# Patient Record
Sex: FEMALE | Race: WHITE | NOT HISPANIC OR LATINO | Employment: PART TIME | URBAN - METROPOLITAN AREA
[De-identification: names, ages, dates, MRNs, and addresses within clinical notes are randomized per-mention and may not be internally consistent; named-entity substitution may affect disease eponyms.]

---

## 2017-07-07 ENCOUNTER — HOSPITAL ENCOUNTER (OUTPATIENT)
Dept: GENERAL RADIOLOGY | Facility: HOSPITAL | Age: 57
Discharge: HOME | End: 2017-07-07
Attending: SPECIALIST | Admitting: SPECIALIST

## 2017-07-07 DIAGNOSIS — M54.2 NECK PAIN: ICD-10-CM

## 2017-07-07 PROCEDURE — 72050 X-RAY EXAM NECK SPINE 4/5VWS: CPT

## 2017-10-16 ENCOUNTER — OFFICE VISIT (OUTPATIENT)
Dept: OBSTETRICS AND GYNECOLOGY | Facility: CLINIC | Age: 57
End: 2017-10-16

## 2017-10-16 VITALS
HEIGHT: 66 IN | DIASTOLIC BLOOD PRESSURE: 84 MMHG | WEIGHT: 162 LBS | BODY MASS INDEX: 26.03 KG/M2 | SYSTOLIC BLOOD PRESSURE: 122 MMHG

## 2017-10-16 DIAGNOSIS — Z78.0 POSTMENOPAUSAL: Primary | ICD-10-CM

## 2017-10-16 DIAGNOSIS — IMO0002 HORMONE DEFICIENCY: ICD-10-CM

## 2017-10-16 DIAGNOSIS — N95.2 VAGINAL ATROPHY: ICD-10-CM

## 2017-10-16 DIAGNOSIS — R68.82 DECREASED LIBIDO: ICD-10-CM

## 2017-10-16 PROCEDURE — 99396 PREV VISIT EST AGE 40-64: CPT | Performed by: OBSTETRICS & GYNECOLOGY

## 2017-10-16 RX ORDER — SERTRALINE HYDROCHLORIDE 100 MG/1
100 TABLET, FILM COATED ORAL
COMMUNITY
End: 2020-08-27

## 2017-10-16 NOTE — PROGRESS NOTES
Subjective:    Patient Tracee Knutson is a 57 y.o. female.   Chief Complaint   Patient presents with   • Gynecologic Exam     Patient is here for annual.   CC's patient is menopausal now she had a very difficult time going through the perimenopause with the severe PMS symptoms and significant  hormone  issues      HPI  this patient is postmenopausal has had chronic depressive episodes since the the past 5-10 years Zoloft Which Does Very Well  she feels very good now being postmenopausal she was on hormone pellets for 2 years but has discontinued them      The following portions of the patient's history were reviewed and updated as appropriate: allergies, current medications, past family history, past medical history, past social history, past surgical history and problem list.      Review of Systems   Constitutional: Negative.    HENT: Negative.    Eyes: Negative.    Respiratory: Negative.    Cardiovascular: Negative.    Gastrointestinal: Negative for abdominal distention, abdominal pain, anal bleeding, blood in stool, constipation, diarrhea, nausea, rectal pain and vomiting.   Endocrine: Negative for cold intolerance, heat intolerance, polydipsia, polyphagia and polyuria.   Genitourinary: Negative.  Negative for decreased urine volume, dyspareunia, dysuria, enuresis, flank pain, frequency, genital sores, hematuria, menstrual problem, pelvic pain, urgency, vaginal bleeding, vaginal discharge and vaginal pain.   Musculoskeletal: Negative.    Skin: Negative.    Allergic/Immunologic: Negative.    Neurological: Negative.    Hematological: Negative for adenopathy. Does not bruise/bleed easily.   Psychiatric/Behavioral: Negative for agitation, confusion and sleep disturbance. The patient is not nervous/anxious.          Objective:      Physical Exam   Constitutional: She appears well-developed and well-nourished. She is not intubated.   HENT:   Head: Hair is normal.   Nose: Nose normal.   Mouth/Throat: Oropharynx is clear  and moist.   Eyes: Conjunctivae are normal.   Neck: Normal carotid pulses and no JVD present. No tracheal tenderness, no spinous process tenderness and no muscular tenderness present. Carotid bruit is not present. No rigidity. No edema, no erythema and normal range of motion present. No thyroid mass and no thyromegaly present.   Cardiovascular: Normal rate, regular rhythm, S1 normal and normal heart sounds.  Exam reveals no gallop.    No murmur heard.  Pulmonary/Chest: Effort normal. No accessory muscle usage or stridor. No apnea, no tachypnea and no bradypnea. She is not intubated. No respiratory distress. She has no wheezes. She has no rales. She exhibits no tenderness. Right breast exhibits no inverted nipple, no mass, no nipple discharge, no skin change and no tenderness. Left breast exhibits no inverted nipple, no mass, no nipple discharge, no skin change and no tenderness.   Abdominal: Soft. Bowel sounds are normal. She exhibits no distension and no mass. There is no tenderness. There is no rebound and no guarding. No hernia.   Genitourinary: Vagina normal and uterus normal. Rectal exam shows no external hemorrhoid, no internal hemorrhoid, no fissure, no mass, no tenderness and anal tone normal. There is no rash, tenderness, lesion or injury on the right labia. There is no rash, tenderness, lesion or injury on the left labia. Uterus is not deviated, not enlarged, not fixed and not tender. Cervix exhibits no motion tenderness, no discharge and no friability. Right adnexum displays no mass and no tenderness. Left adnexum displays no mass and no tenderness. No erythema, tenderness or bleeding in the vagina. No foreign body in the vagina. No signs of injury around the vagina. No vaginal discharge found.   Genitourinary Comments: Vaginal atrophy patient's had no bleeding no other issues   Musculoskeletal: She exhibits no edema or tenderness.        Right shoulder: She exhibits no tenderness, no swelling, no pain  and no spasm.   Lymphadenopathy:        Head (right side): No submental, no submandibular, no tonsillar, no preauricular, no posterior auricular and no occipital adenopathy present.        Head (left side): No submental, no submandibular, no tonsillar, no preauricular, no posterior auricular and no occipital adenopathy present.     She has no cervical adenopathy.        Right cervical: No superficial cervical, no deep cervical and no posterior cervical adenopathy present.       Left cervical: No superficial cervical, no deep cervical and no posterior cervical adenopathy present.        Right axillary: No pectoral and no lateral adenopathy present.        Left axillary: No pectoral and no lateral adenopathy present.       Right: No inguinal, no supraclavicular and no epitrochlear adenopathy present.        Left: No inguinal, no supraclavicular and no epitrochlear adenopathy present.   Neurological: No cranial nerve deficit. Coordination normal.   Skin: Skin is warm and dry. No abrasion, no bruising, no burn, no lesion, no petechiae, no purpura and no rash noted. Rash is not macular, not maculopapular, not nodular and not urticarial. No cyanosis or erythema. No pallor. Nails show no clubbing.   Psychiatric: She has a normal mood and affect. Her behavior is normal.         Assessment and Plan:  Patient was on hormones but did not like them and is now off of everything she does have symptoms of vaginal atrophy and exam does show vaginal atrophy so the patient should use the estrogen cream for the vagina and a and D ointment    Patient has been instructed to perform a self breast exam on a weekly basis, a yearly mammogram, pap smear yearly unless instructed otherwise and bone density every 2 years.  I recommended that the patient not smoke, and discussed smoking cessation when appropriate.     Tracee was seen today for gynecologic exam.    Diagnoses and all orders for this visit:    Postmenopausal    Hormone  deficiency    Vaginal atrophy    Decreased libido

## 2017-10-19 LAB
C TRACH RRNA CVX QL NAA+PROBE: NEGATIVE
CONV .: NORMAL
CYTOLOGIST CVX/VAG CYTO: NORMAL
CYTOLOGY CVX/VAG DOC THIN PREP: NORMAL
DX ICD CODE: NORMAL
HIV 1 & 2 AB SER-IMP: NORMAL
N GONORRHOEA RRNA CVX QL NAA+PROBE: NEGATIVE
OTHER STN SPEC: NORMAL
PATH REPORT.FINAL DX SPEC: NORMAL
STAT OF ADQ CVX/VAG CYTO-IMP: NORMAL
T VAGINALIS RRNA SPEC QL NAA+PROBE: NEGATIVE

## 2017-11-09 ENCOUNTER — OFFICE VISIT (OUTPATIENT)
Dept: NEUROSURGERY | Facility: CLINIC | Age: 57
End: 2017-11-09

## 2017-11-09 VITALS
DIASTOLIC BLOOD PRESSURE: 76 MMHG | WEIGHT: 161 LBS | BODY MASS INDEX: 25.88 KG/M2 | HEIGHT: 66 IN | SYSTOLIC BLOOD PRESSURE: 128 MMHG

## 2017-11-09 DIAGNOSIS — M54.2 CERVICALGIA: Primary | ICD-10-CM

## 2017-11-09 PROCEDURE — 99243 OFF/OP CNSLTJ NEW/EST LOW 30: CPT | Performed by: NEUROLOGICAL SURGERY

## 2017-11-09 NOTE — PROGRESS NOTES
Subjective   Patient ID: Tracee Knutson is a 57 y.o. female who is being seen for consultation today at the request of Jesus Roberts MD for neck pain. She had an MRI of the cervical spine at [facility] on 11/1/17. She presents unaccompanied.     History of Present Illness 56 yo female with a history of neck pain into the paraspinal muscles on the right which began sponataneously.  She reports daily pain.  Lifting aggravates.  Sleep is interrupted.  SHe reports some urinary retention type of complaints.  No genital numbness.  Strength is normal.  She reports her pain is rated 2-3 currently.  There is positional exacerbation to a 9/10.  She is a nonsmoker.  Patient has had traction and accupuncture without help.  She had an IM injection of steroids which helped.  SHe take sibuprfen 200mg qod.      The following portions of the patient's history were reviewed and updated as appropriate: allergies, current medications, past family history, past medical history, past social history, past surgical history and problem list.    Review of Systems   Constitutional: Negative for chills and fever.   Respiratory: Negative for cough and shortness of breath.    Cardiovascular: Negative for chest pain, palpitations and leg swelling.   Gastrointestinal: Negative for abdominal pain and constipation.   Genitourinary: Negative for difficulty urinating and enuresis.   Musculoskeletal: Positive for neck pain. Negative for back pain and gait problem.   Skin: Negative for rash.   Neurological: Negative for weakness and numbness (or tingling).   Hematological: Does not bruise/bleed easily.   Psychiatric/Behavioral: Negative for sleep disturbance.       Objective   Physical Exam   Constitutional: She is oriented to person, place, and time.   Neurological: She is oriented to person, place, and time. Gait normal.   Reflex Scores:       Tricep reflexes are 1+ on the right side and 1+ on the left side.       Bicep reflexes are 1+ on the right side  and 1+ on the left side.       Brachioradialis reflexes are 1+ on the right side and 1+ on the left side.       Patellar reflexes are 1+ on the right side and 1+ on the left side.       Achilles reflexes are 1+ on the right side and 1+ on the left side.    Neurologic Exam     Mental Status   Oriented to person, place, and time.     Motor Exam   Muscle bulk: normal  Overall muscle tone: normal    Strength   Right deltoid: 5/5  Left deltoid: 5/5  Right biceps: 5/5  Left biceps: 5/5  Right triceps: 5/5  Left triceps: 5/5  Right wrist flexion: 5/5  Left wrist flexion: 5/5  Right wrist extension: 5/5  Left wrist extension: 5/5  Right interossei: 5/5  Left interossei: 5/5  Right iliopsoas: 5/5  Left iliopsoas: 5/5  Right quadriceps: 5/5  Left quadriceps: 5/5  Right hamstrin/5  Left hamstrin/5  Right anterior tibial: 5/5  Left anterior tibial: 5/5  Right gastroc: 5/5  Left gastroc: 5/5    Sensory Exam   Right arm light touch: diminished around right deltoid.  Left arm light touch: normal  Right leg light touch: normal  Left leg light touch: normal  Right arm pinprick: diminished around right deltoid.  Left arm pinprick: normal  Right leg pinprick: normal  Left leg pinprick: normal    Gait, Coordination, and Reflexes     Gait  Gait: normal    Reflexes   Right brachioradialis: 1+  Left brachioradialis: 1+  Right biceps: 1+  Left biceps: 1+  Right triceps: 1+  Left triceps: 1+  Right patellar: 1+  Left patellar: 1+  Right achilles: 1+  Left achilles: 1+      Assessment/Plan   Independent Review of Radiographic Studies:    Mild reversal of the normal lordosis of the c spine secondary to diffuse ddd.  RIght sided c34 or c45 foraminal stenosis  Medical Decision Making:  Patient disease is relatively mild but there are a few focal spots which could result in her discomfort.  I suggested diclofenac.  She has a sensitive stomach and I implored her to take nsaids with food (may need to switch to mobic if intolerant).  I  ordered diclofenac and will see her back.  JU[facility] CARLOS's PT , home traction or gabapentin all may play a role.  Surgery would be a last resort unless she developed refractory pain or other red flags per her desires.      Tracee was seen today for neck pain.    Diagnoses and all orders for this visit:    Cervicalgia    Other orders  -     diclofenac (VOLTAREN) 50 MG EC tablet; Take 1 tablet by mouth 2 (Two) Times a Day.      No Follow-up on file.

## 2017-12-18 ENCOUNTER — OFFICE VISIT (OUTPATIENT)
Dept: NEUROSURGERY | Facility: CLINIC | Age: 57
End: 2017-12-18

## 2017-12-18 VITALS
SYSTOLIC BLOOD PRESSURE: 90 MMHG | BODY MASS INDEX: 26.2 KG/M2 | DIASTOLIC BLOOD PRESSURE: 60 MMHG | WEIGHT: 163 LBS | HEIGHT: 66 IN

## 2017-12-18 DIAGNOSIS — M54.2 CERVICALGIA: Primary | ICD-10-CM

## 2017-12-18 PROCEDURE — 99213 OFFICE O/P EST LOW 20 MIN: CPT | Performed by: NEUROLOGICAL SURGERY

## 2017-12-18 RX ORDER — PREGABALIN 50 MG/1
50 CAPSULE ORAL
COMMUNITY
End: 2020-08-27

## 2017-12-18 NOTE — PROGRESS NOTES
Subjective   Patient ID: Tracee Knutson is a 57 y.o. female who is here today for follow-up for neck pain. She presents unaccompanied.     History of Present Illness  She tried diclofenac for 3 weeks with nominal results.  SHe has most trouble at night.  Lifting also exacerbates.  Most of her pain is in the paraspinal region and right shoulder.      The following portions of the patient's history were reviewed and updated as appropriate: allergies, current medications, past family history, past medical history, past social history, past surgical history and problem list.    Review of Systems   Musculoskeletal: Positive for arthralgias (Right Shoulder ) and neck pain.   Neurological: Negative for numbness.       Objective   Physical Exam  Neurologic Exam      Strength   Right deltoid: 5/5  Left deltoid: 5/5  Right biceps: 5/5  Left biceps: 5/5  Right triceps: 5/5  Left triceps: 5/5  Right wrist flexion: 5/5  Left wrist flexion: 5/5  Right wrist extension: 5/5  Left wrist extension: 5/5  Right interossei: 5/5  Left interossei: 5/5    Reflexes   Right brachioradialis: 1+  Left brachioradialis: 1+  Right biceps: 1+  Left biceps: 1+  Right triceps: 1+  Left triceps: 1+  Right patellar: 1+  Left patellar: 1+  Right achilles: 1+  Left achilles: 1+    Sensory Exam   Right arm light touch: diminished around right deltoid.  Left arm light touch: normal  Right leg light touch: normal  Left leg light touch: normal  Right arm pinprick: diminished around right deltoid.  Left arm pinprick: normal  Right leg pinprick: normal  Left leg pinprick: normal      Some pain at AC joint on the right  Painless ROM of bilateral shoulders.       Assessment/Plan   Independent Review of Radiographic Studies:      Medical Decision Making:  SHe failed diclofenac and was started qhs lyrica with limited results.  We will refer her to PT and see her in 1 months time.  If she cannot tolerate PT she will call and I will refer for [facility] CARL[facility]'s.     Tracee was  seen today for neck pain.    Diagnoses and all orders for this visit:    Cervicalgia  -     Ambulatory Referral to Physical Therapy Evaluate and treat    No Follow-up on file.

## 2017-12-27 ENCOUNTER — APPOINTMENT (OUTPATIENT)
Dept: PHYSICAL THERAPY | Facility: HOSPITAL | Age: 57
End: 2017-12-27
Attending: NEUROLOGICAL SURGERY

## 2018-01-05 ENCOUNTER — HOSPITAL ENCOUNTER (OUTPATIENT)
Dept: PHYSICAL THERAPY | Facility: HOSPITAL | Age: 58
Setting detail: THERAPIES SERIES
Discharge: HOME | End: 2018-01-05
Attending: NEUROLOGICAL SURGERY

## 2018-01-05 DIAGNOSIS — M54.2 CERVICALGIA: Primary | ICD-10-CM

## 2018-01-05 PROCEDURE — 97162 PT EVAL MOD COMPLEX 30 MIN: CPT | Performed by: PHYSICAL THERAPIST

## 2018-01-05 PROCEDURE — G8982 BODY POS GOAL STATUS: HCPCS | Performed by: PHYSICAL THERAPIST

## 2018-01-05 PROCEDURE — 97110 THERAPEUTIC EXERCISES: CPT | Performed by: PHYSICAL THERAPIST

## 2018-01-05 PROCEDURE — 97140 MANUAL THERAPY 1/> REGIONS: CPT | Performed by: PHYSICAL THERAPIST

## 2018-01-05 PROCEDURE — G8981 BODY POS CURRENT STATUS: HCPCS | Performed by: PHYSICAL THERAPIST

## 2018-01-05 NOTE — THERAPY EVALUATION
Outpatient Physical Therapy Ortho Initial Evaluation  [facility]     Patient Name: Tracee Knutson  : 1960  MRN: [id number]  Today's Date: 2018      Visit Date: 2018    Patient Active Problem List   Diagnosis   • Cervicalgia        Past Medical History:   Diagnosis Date   • Depression    • Depression    • TIA (transient ischemic attack)         Past Surgical History:   Procedure Laterality Date   • MIDDLE EAR SURGERY         Visit Dx:     ICD-10-CM ICD-9-CM   1. Cervicalgia M54.2 723.1             Patient History       18 1500          History    Chief Complaint Pain  -KJ      Type of Pain Neck pain  -KJ      Date Current Problem(s) Began 17  -KJ      Brief Description of Current Complaint R sided neck and shoulder pain started insidiously 8 months ago. Trying not to be on the phone, tried not watching TV. Kept thinking it would go away. PCP gave shot, that helped a bit when she had to go out of town for a month. Tried medication but no change. Stands for work, walking and standing is OK. Sitting and carrying is bad, tingling and numbness into shoulder. Doesn't exercise but not sure what to do. Ache, stiffness inn base of neck, tingling in neck and to R shoulder, burning, numb. Nothing past shoulder. No weakness. No change with valsalva. Not sure what to do to manage. Tried some creams. Tries Aleve but still nothing helps sleep. Tried a bunch of different pillows. Hard to fall asleep, doesn't wake up that she knows. Has done PT for lower back. No falls.   -KJ      Fall Risk Assessment    Any falls in the past year: No  -KJ        User Key  (r) = Recorded By, (t) = Taken By, (c) = Cosigned By    Initials Name Provider Type    FLOYD Samaniego, PT Physical Therapist                PT Ortho       18 1100    Posture/Observations    Posture/Observations Comments In sitting in lobby, pt. slouced and reclined. Excellen posture in standing and sitting on edge of chair and mat.    -KJ    Sensory Screen for Light Touch- Upper Quarter Clearing    C4 (posterior shoulder) Right:;Diminished;Left:;Intact  -KJ    C5 (lateral upper arm) Bilateral:;Intact  -KJ    C6 (tip of thumb) Bilateral:;Intact  -KJ    C7 (tip of 3rd finger) Bilateral:;Intact  -KJ    C8 (tip of 5th finger) Bilateral:;Intact  -KJ    Myotomal Screen- Upper Quarter Clearing    Shoulder flexion (C5) WNL;4+ (Good +)  -KJ    Elbow flexion/wrist extension (C6) Bilateral:;4+ (Good +)  -KJ    Elbow extension/wrist flexion (C7) Bilateral:;4+ (Good +)  -KJ    Finger flexion/ (C8) Bilateral:;4 (Good)  -KJ    Finger abduction (T1) Bilateral:;4- (Good -)  -KJ    Cervical/Shoulder ROM Screen    Cervical flexion Impaired   75% of WNL no pain  -KJ    Cervical extension Normal   no pain  -KJ    Cervical lateral flexion Impaired   40% to R, 50% to left with tightness on R  -KJ    Cervical rotation Impaired   75% B without pain  -KJ    Cervical Palpation    Upper Traps Right:;Tender;Guarded/taut;Trigger point  -KJ    Cervical/Thoracic Special Tests    Spurlings (Foraminal Compression) Bilateral:;Negative  -KJ    Cervical Compression (Forarminal Compression vs. Facet Pain) Positive   mild discomfort on R  -KJ    Cervical Distraction (Foraminal Compression vs. Facet Pain) Positive  -KJ    MMT (Manual Muscle Testing)    General MMT Assessment Detail Cervical isometrics grossly 4/5 except extension 4+/5  -KJ      User Key  (r) = Recorded By, (t) = Taken By, (c) = Cosigned By    Initials Name Provider Type    FLOYD Samaniego, PT Physical Therapist                      Therapy Education  Education Details: At length disucssion regarding evaluation findings, imaging, prognosis, importance of posture, cervical and lumbar pillow positioning, PT treatment options, plan of care. Use of ice or heat is fine.   Given: HEP, Symptoms/condition management, Pain management, Posture/body mechanics  Program: New  How Provided: Verbal, Demonstration,  "Written  Provided to: Patient  Level of Understanding: Teach back education performed, Verbalized, Demonstrated           PT OP Goals       01/05/18 1500       PT Short Term Goals    STG Date to Achieve 01/19/18  -KJ     STG 1 Pt. will be independent and compliant with initial home exercise program.  -KJ     STG 1 Progress New  -KJ     STG 2 Pt. will report R neck pain </=4-5/10 to increase ease of sitting to watch TV.   -KJ     STG 2 Progress New  -KJ     STG 3 Pt. will report 20% faster falling asleep time due to neck pain.  -KJ     STG 3 Progress New  -KJ     Long Term Goals    LTG Date to Achieve 02/04/18  -KJ     LTG 1 Pt. will be independent and compliant with advanced home exercise program.   -KJ     LTG 1 Progress New  -KJ     LTG 2 Pt. will report R neck pain </= 2-3/10 to increase ease of sitting through meal.   -KJ     LTG 2 Progress New  -KJ     LTG 3 Pt. will score </=22% on NDI, indicating decreased perceived functinoal disability.  -KJ     LTG 3 Progress New  -KJ     Time Calculation    PT Goal Re-Cert Due Date 02/04/18  -KJ       User Key  (r) = Recorded By, (t) = Taken By, (c) = Cosigned By    Initials Name Provider Type    FLOYD Samaniego, PT Physical Therapist                PT Assessment/Plan       01/05/18 1517       PT Assessment    Functional Limitations Performance in self-care ADL;Limitation in home management;Limitations in functional capacity and performance  -KJ     Impairments Pain;Muscle strength;Joint mobility;Joint integrity;Posture;Range of motion  -KJ     Assessment Comments Pt. is a 57 year old female referred to outpatient therapy for 8 month history of evolving R cervical pain with radicular symptoms to R shoulder. Imaging (+) for moderate to severe foraminal narrowing with nerve involvement. Pt. presents with slightly decreased cervical ROM, excellent posture with focus, poor when \"relaxed,\" (+) Compression test for mild symptoms, trigger points R UT, (+) Distraction test, " and Neck Disability score of 32% where 0% represents no perceived functional disbility. Pt.'s signs and symptoms are consistent with cervical DJD. Pt. will benefit from skilled physical therapy to address these issues that affect her participation in sleeping, sitting, and lifting.   -KJ     Please refer to paper survey for additional self-reported information Yes  -KJ     Rehab Potential Good  -KJ     Patient/caregiver participated in establishment of treatment plan and goals Yes  -KJ     Patient would benefit from skilled therapy intervention Yes  -KJ     PT Plan    PT Frequency 2x/week;1x/week  -KJ     Predicted Duration of Therapy Intervention (days/wks) 4 weeks  -KJ     Planned CPT's? PT EVAL MOD COMPLELITY: 87451;PT MANUAL THERAPY EA 15 MIN: 17524;PT ULTRASOUND EA 15 MIN: 67963;PT TRACTION CERVICAL: 90223;PT HOT OR COLD PACK TREAT MCARE;PT ELECTRICAL STIM UNATTEND: ;PT THER PROC EA 15 MIN: 02133  -KJ     PT Plan Comments Trial of cervical traction, progress postural stab strengthening program (chin tucks, supine over towel, doorway stretch, theraband ex's, etc). Discuss DDN and trial to R UT.   -KJ       User Key  (r) = Recorded By, (t) = Taken By, (c) = Cosigned By    Initials Name Provider Type    FLOYD Samaniego, PT Physical Therapist                  Exercises       01/05/18 1100          Exercise 1    Exercise Name 1 Reverse shoulder rolls  -KJ      Cueing 1 Verbal  -KJ      Reps 1 5  -KJ      Exercise 2    Exercise Name 2 Scapular retraction  -KJ      Cueing 2 Verbal  -KJ      Reps 2 5  -KJ        User Key  (r) = Recorded By, (t) = Taken By, (c) = Cosigned By    Initials Name Provider Type    FLOYD Samaniego, PT Physical Therapist           Manual Rx (last 36 hours)      Manual Treatments       01/05/18 1500          Manual Rx 1    Manual Rx 1 Type Gentle manual cervical distraction   -KJ        User Key  (r) = Recorded By, (t) = Taken By, (c) = Cosigned By    Initials Name Provider Type     KJ Kena Samaniego, PT Physical Therapist                      Outcome Measure Options: Neck Disability Index (NDI)  Neck Disability Index  Neck Disability Index Comments: 32%      Time Calculation:   Start Time: 1115  Stop Time: 1200  Time Calculation (min): 45 min     Therapy Charges for Today     Code Description Service Date Service Provider Modifiers Qty    68410358733 HC PT CHNG MAIN POS CURRENT 1/5/2018 Kena Samaniego, PT GP, CJ 1    72646651681 HC PT CHNG MAIN POS PROJECTED 1/5/2018 Kena Samaniego, PT GP, CI 1    19723676750 HC PT THER PROC EA 15 MIN 1/5/2018 Kena Samaniego, PT GP 1    09783318034 HC PT EVAL MOD COMPLEXITY 1 1/5/2018 Kena Samaniego, PT GP 1    76925561415 HC PT MANUAL THERAPY EA 15 MIN 1/5/2018 Kena Samaniego, PT GP 1          PT G-Codes  Outcome Measure Options: Neck Disability Index (NDI)  Score: 32%  Functional Limitation: Changing and maintaining body position  Changing and Maintaining Body Position Current Status (): At least 20 percent but less than 40 percent impaired, limited or restricted  Changing and Maintaining Body Position Goal Status (): At least 1 percent but less than 20 percent impaired, limited or restricted         Kena Samaniego, PT  1/5/2018

## 2018-01-11 ENCOUNTER — HOSPITAL ENCOUNTER (OUTPATIENT)
Dept: PHYSICAL THERAPY | Facility: HOSPITAL | Age: 58
Setting detail: THERAPIES SERIES
Discharge: HOME | End: 2018-01-11

## 2018-01-11 DIAGNOSIS — M54.2 CERVICALGIA: Primary | ICD-10-CM

## 2018-01-11 PROCEDURE — 97110 THERAPEUTIC EXERCISES: CPT | Performed by: PHYSICAL THERAPIST

## 2018-01-11 PROCEDURE — 97140 MANUAL THERAPY 1/> REGIONS: CPT | Performed by: PHYSICAL THERAPIST

## 2018-01-11 NOTE — THERAPY TREATMENT NOTE
Outpatient Physical Therapy Ortho Treatment Note  [facility]     Patient Name: Tracee Knutson  : 1960  MRN: [id number]  Today's Date: 2018      Visit Date: 2018    Visit Dx:    ICD-10-CM ICD-9-CM   1. Cervicalgia M54.2 723.1       Patient Active Problem List   Diagnosis   • Cervicalgia        Past Medical History:   Diagnosis Date   • Depression    • Depression    • TIA (transient ischemic attack)         Past Surgical History:   Procedure Laterality Date   • MIDDLE EAR SURGERY                               PT Assessment/Plan       18 1732       PT Assessment    Assessment Comments First session since initial evalution. Reviewed initial HEP and added new postural re-education exercises. Neck book for home study/education given on posture and exacerbating activities. Manual therapy techniques revealed increased tension/trigger point in R upper trap and R paraspinal tissue (c2-3, c3-4).   -CK     PT Plan    PT Plan Comments UBE, exercises as indicated in flowsheet, consider addition of upper trap stretch, chin tucks. Traction if indicated.  -CK       User Key  (r) = Recorded By, (t) = Taken By, (c) = Cosigned By    Initials Name Provider Type    CK Cruz Wayne, PT Physical Therapist                    Exercises       18 1600          Subjective Comments    Subjective Comments Pain is worse at night watching tv or trying to sleeping. Pain/tingling goes down to tip of shoulder.   -CK      Subjective Pain    Able to rate subjective pain? yes  -CK      Pre-Treatment Pain Level 4  -CK      Exercise 1    Exercise Name 1 Reverse shoulder rolls  -CK      Cueing 1 Verbal  -CK      Reps 1 15  -CK      Exercise 2    Exercise Name 2 Scapular retraction  -CK      Cueing 2 Verbal  -CK      Reps 2 10  -CK      Time (Seconds) 2 5  -CK      Exercise 3    Exercise Name 3 thoracic towel roll stretch  -CK      Time (Minutes) 3 2  -CK      Additional Comments alternating arms overhead x 15  -CK       Exercise 4    Exercise Name 4 shoulder ext  -CK      Cueing 4 Tactile;Verbal  -CK      Reps 4 15  -CK      Additional Comments RTB  -CK      Exercise 5    Exercise Name 5 Doorway stretch  -CK      Reps 5 3  -CK      Time (Seconds) 5 30  -CK      Exercise 6    Exercise Name 6 UBE  -CK      Time (Minutes) 6 4  -CK      Additional Comments watch posture  -CK        User Key  (r) = Recorded By, (t) = Taken By, (c) = Cosigned By    Initials Name Provider Type    CK Cruz S Tone, PT Physical Therapist                        Manual Rx (last 36 hours)      Manual Treatments       01/11/18 1700          Manual Rx 1    Manual Rx 1 Location STM/MFR to B upper traps, levator and cervical paraspinals  -CK      Manual Rx 1 Type pt prone and supine  -CK      Manual Rx 1 Grade gentle manual cervical distraction as well as PA and lateral glides (multiple ranges of rotation)  -CK      Manual Rx 1 Duration Passive stretching to B upper traps  -CK      Manual Rx 2    Manual Rx 2 Location Total time- 25 min  -CK        User Key  (r) = Recorded By, (t) = Taken By, (c) = Cosigned By    Initials Name Provider Type    CK Cruz PEREZ Tone, PT Physical Therapist                PT OP Goals       01/11/18 1700       PT Short Term Goals    STG Date to Achieve 01/19/18  -CK     STG 1 Pt. will be independent and compliant with initial home exercise program.  -CK     STG 1 Progress Ongoing  -CK     STG 1 Progress Comments reports compliance  -CK     STG 2 Pt. will report R neck pain </=4-5/10 to increase ease of sitting to watch TV.   -CK     STG 2 Progress Ongoing  -CK     STG 2 Progress Comments 4/5  -CK     STG 3 Pt. will report 20% faster falling asleep time due to neck pain.  -CK     STG 3 Progress Ongoing  -CK     Long Term Goals    LTG Date to Achieve 02/04/18  -CK     LTG 1 Pt. will be independent and compliant with advanced home exercise program.   -CK     LTG 1 Progress Ongoing  -CK     LTG 1 Progress Comments progressed today  -CK     LTG  2 Pt. will report R neck pain </= 2-3/10 to increase ease of sitting through meal.   -CK     LTG 2 Progress Ongoing  -CK     LTG 3 Pt. will score </=22% on NDI, indicating decreased perceived functinoal disability.  -CK     LTG 3 Progress Ongoing  -CK       User Key  (r) = Recorded By, (t) = Taken By, (c) = Cosigned By    Initials Name Provider Type    CK Cruz Wayne, PT Physical Therapist          Therapy Education  Education Details: Cervical book given for home study on posture and body mechanics  Given: Posture/body mechanics, Symptoms/condition management  Program: New  How Provided: Verbal, Demonstration, Written  Provided to: Patient  Level of Understanding: Teach back education performed, Verbalized              Time Calculation:   Start Time: 1630  Stop Time: 1715  Time Calculation (min): 45 min    Therapy Charges for Today     Code Description Service Date Service Provider Modifiers Qty    33733787443  PT MANUAL THERAPY EA 15 MIN 1/11/2018 Cruz Wayne, PT GP 2    74423508517 HC PT THER PROC EA 15 MIN 1/11/2018 Cruz Wayne PT GP 1                    Cruz Wayne, PT  1/11/2018

## 2018-01-19 ENCOUNTER — HOSPITAL ENCOUNTER (OUTPATIENT)
Dept: PHYSICAL THERAPY | Facility: HOSPITAL | Age: 58
Setting detail: THERAPIES SERIES
Discharge: HOME | End: 2018-01-19

## 2018-01-19 DIAGNOSIS — M54.2 CERVICALGIA: Primary | ICD-10-CM

## 2018-01-19 PROCEDURE — 97110 THERAPEUTIC EXERCISES: CPT | Performed by: PHYSICAL THERAPIST

## 2018-01-19 PROCEDURE — 97140 MANUAL THERAPY 1/> REGIONS: CPT | Performed by: PHYSICAL THERAPIST

## 2018-01-19 NOTE — THERAPY TREATMENT NOTE
Outpatient Physical Therapy Ortho Treatment Note  [facility]     Patient Name: Tracee Knutson  : 1960  MRN: [id number]  Today's Date: 2018      Visit Date: 2018    Visit Dx:    ICD-10-CM ICD-9-CM   1. Cervicalgia M54.2 723.1       Patient Active Problem List   Diagnosis   • Cervicalgia        Past Medical History:   Diagnosis Date   • Depression    • Depression    • TIA (transient ischemic attack)         Past Surgical History:   Procedure Laterality Date   • MIDDLE EAR SURGERY                               PT Assessment/Plan       18 1044       PT Assessment    Assessment Comments Continued with low level postural re-education/strengthening exercises prior to manual therapy techniques. Continued tension noted in R cervical/scapular complex. Trigger point noted in R rhomboid tissue, levator tissue, and posterior fibers of upper trap. Education on self release techniques to manage soft tissue tension between sessions.   -CK     PT Plan    PT Plan Comments UBE, exercises in flowsheet, consider addition of upper trap stretch. Traction if indicated.  -CK       User Key  (r) = Recorded By, (t) = Taken By, (c) = Cosigned By    Initials Name Provider Type    JONATHON Wayne, PT Physical Therapist                    Exercises       18 1000          Subjective Comments    Subjective Comments I felt really good for about 24-36 hours after that first session. I have had to carry some heavy stuff the last few days and it seems to have flared in back up. No symptoms down into my arm today.  -CK      Subjective Pain    Able to rate subjective pain? yes  -CK      Pre-Treatment Pain Level 6  -CK      Post-Treatment Pain Level 4  -CK      Exercise 1    Exercise Name 1 Reverse shoulder rolls  -CK      Cueing 1 Verbal  -CK      Sets 1 3  -CK      Reps 1 10  -CK      Additional Comments in between doorway stretches  -CK      Exercise 2    Exercise Name 2 Scapular retraction  -CK      Cueing 2 Verbal   -CK      Reps 2 15  -CK      Time (Seconds) 2 3  -CK      Additional Comments RTB  -CK      Exercise 3    Exercise Name 3 blue noodle thoracic stretch  -CK      Time (Minutes) 3 2  -CK      Additional Comments alternating arms overhead x 15  -CK      Exercise 4    Exercise Name 4 shoulder ext  -CK      Cueing 4 Tactile;Verbal  -CK      Sets 4 2  -CK      Reps 4 10  -CK      Additional Comments RTB  -CK      Exercise 5    Exercise Name 5 Doorway stretch  -CK      Reps 5 3  -CK      Time (Seconds) 5 30  -CK      Exercise 6    Exercise Name 6 UBE  -CK      Time (Minutes) 6 4  -CK      Additional Comments dont lean against chair.   -CK      Exercise 7    Exercise Name 7 supine serratus punches  -CK      Reps 7 15  -CK      Additional Comments #2, on blue noodle  -CK        User Key  (r) = Recorded By, (t) = Taken By, (c) = Cosigned By    Initials Name Provider Type    CK Cruz Wayne PT Physical Therapist                        Manual Rx (last 36 hours)      Manual Treatments       01/19/18 0900          Manual Rx 1    Manual Rx 1 Location STM/MFR to B upper traps, levator, cervical paraspinals, rhomboids, and scapular tissue  -CK      Manual Rx 1 Type pt prone and supine  -CK      Manual Rx 1 Grade gentle manual cervical distraction as well as PA and lateral glides (multiple ranges of rotation)  -CK      Manual Rx 1 Duration Passive stretching to B upper traps and R levator  -CK      Manual Rx 2    Manual Rx 2 Location Total time- 25 min  -CK        User Key  (r) = Recorded By, (t) = Taken By, (c) = Cosigned By    Initials Name Provider Type    CK Cruz Wayne PT Physical Therapist                PT OP Goals       01/19/18 1000       PT Short Term Goals    STG Date to Achieve 01/19/18  -CK     STG 1 Pt. will be independent and compliant with initial home exercise program.  -CK     STG 1 Progress Met  -CK     STG 2 Pt. will report R neck pain </=4-5/10 to increase ease of sitting to watch TV.   -CK     STG 2  Progress Ongoing  -CK     STG 2 Progress Comments 5-6/10 today. flare up from carrying heavy items at work  -CK     STG 3 Pt. will report 20% faster falling asleep time due to neck pain.  -CK     STG 3 Progress Ongoing  -CK     Long Term Goals    LTG Date to Achieve 02/04/18  -CK     LTG 1 Pt. will be independent and compliant with advanced home exercise program.   -CK     LTG 1 Progress Ongoing  -CK     LTG 1 Progress Comments self massage with tennis ball given today  -CK     LTG 2 Pt. will report R neck pain </= 2-3/10 to increase ease of sitting through meal.   -CK     LTG 2 Progress Ongoing  -CK     LTG 3 Pt. will score </=22% on NDI, indicating decreased perceived functinoal disability.  -CK     LTG 3 Progress Ongoing  -CK       User Key  (r) = Recorded By, (t) = Taken By, (c) = Cosigned By    Initials Name Provider Type    CK Cruz Wayne, PT Physical Therapist          Therapy Education  Education Details: Use of tennis ball for self massage/release at home against wall  Given: Symptoms/condition management, Pain management  Program: New  How Provided: Verbal, Demonstration  Provided to: Patient  Level of Understanding: Teach back education performed, Verbalized              Time Calculation:   Start Time: 1000  Stop Time: 1045  Time Calculation (min): 45 min    Therapy Charges for Today     Code Description Service Date Service Provider Modifiers Qty    52761044737  PT MANUAL THERAPY EA 15 MIN 1/19/2018 Cruz Wayne, PT GP 2    26046027909 HC PT THER PROC EA 15 MIN 1/19/2018 Cruz Wayne PT GP 1                    Cruz Wayne, PT  1/19/2018

## 2018-01-25 ENCOUNTER — HOSPITAL ENCOUNTER (OUTPATIENT)
Dept: PHYSICAL THERAPY | Facility: HOSPITAL | Age: 58
Setting detail: THERAPIES SERIES
Discharge: HOME | End: 2018-01-25
Attending: NEUROLOGICAL SURGERY

## 2018-01-25 DIAGNOSIS — M54.2 CERVICALGIA: Primary | ICD-10-CM

## 2018-01-25 PROCEDURE — 97140 MANUAL THERAPY 1/> REGIONS: CPT | Performed by: PHYSICAL THERAPIST

## 2018-01-25 PROCEDURE — 97110 THERAPEUTIC EXERCISES: CPT | Performed by: PHYSICAL THERAPIST

## 2018-01-25 NOTE — THERAPY TREATMENT NOTE
Outpatient Physical Therapy Ortho Treatment Note  [facility]     Patient Name: Tracee Knutson  : 1960  MRN: [id number]  Today's Date: 2018      Visit Date: 2018    Visit Dx:    ICD-10-CM ICD-9-CM   1. Cervicalgia M54.2 723.1       Patient Active Problem List   Diagnosis   • Cervicalgia        Past Medical History:   Diagnosis Date   • Depression    • Depression    • TIA (transient ischemic attack)         Past Surgical History:   Procedure Laterality Date   • MIDDLE EAR SURGERY                               PT Assessment/Plan       18 1724       PT Assessment    Assessment Comments Patient reports 1-2 days of relief after manual therapy techniques but eventual return of symptoms. No symptoms past the R shoulder at this time. Palpable trigger point in R upper trap/levator tissue. Trial of Deep dry needling to cervical tissue. Large twitch responses noted.   -CK       User Key  (r) = Recorded By, (t) = Taken By, (c) = Cosigned By    Initials Name Provider Type    JONATHON Wayne, PT Physical Therapist                Modalities       18 1500          Ice    Ice Applied Yes  -CK      Location R cervical tissue, pt in L sidelying  -CK      Rx Minutes 10 mins  -CK      Ice S/P Rx Yes  -CK        User Key  (r) = Recorded By, (t) = Taken By, (c) = Cosigned By    Initials Name Provider Type    CK Cruz Wayne, PT Physical Therapist                Exercises       18 1500          Subjective Comments    Subjective Comments I got a new pillow which seems to support my neck better when sleeping. I am not getting worse but havent noticed much improvement. It is better for 1-2 days after I am here and then it comes back.   -CK      Subjective Pain    Able to rate subjective pain? yes  -CK      Pre-Treatment Pain Level 5  -CK      Exercise 1    Exercise Name 1 Reverse shoulder rolls  -CK      Cueing 1 Verbal  -CK      Sets 1 2  -CK      Reps 1 15  -CK      Additional Comments in between  doorway stretches  -CK      Exercise 2    Exercise Name 2 Scapular retraction  -CK      Cueing 2 Verbal  -CK      Reps 2 15  -CK      Time (Seconds) 2 3  -CK      Additional Comments GTB  -CK      Exercise 3    Exercise Name 3 blue noodle thoracic stretch  -CK      Time (Minutes) 3 2  -CK      Additional Comments alternating arms overhead x 20  -CK      Exercise 4    Exercise Name 4 shoulder ext  -CK      Cueing 4 Tactile;Verbal  -CK      Sets 4 2  -CK      Reps 4 10  -CK      Additional Comments GTB  -CK      Exercise 5    Exercise Name 5 Doorway stretch  -CK      Reps 5 2  -CK      Time (Seconds) 5 30  -CK      Exercise 6    Exercise Name 6 UBE  -CK      Time (Minutes) 6 4  -CK      Additional Comments good posture, dont lean against back of chair  -CK      Exercise 7    Exercise Name 7 supine serratus punches  -CK      Reps 7 15  -CK      Additional Comments #3  -CK      Exercise 8    Exercise Name 8 Chin tucks  -CK      Reps 8 12  -CK      Time (Seconds) 8 4  -CK      Exercise 9    Exercise Name 9 B shoulder ER, on noodle  -CK      Reps 9 15  -CK      Additional Comments RTB  -CK        User Key  (r) = Recorded By, (t) = Taken By, (c) = Cosigned By    Initials Name Provider Type    CK Cruz Wayne, PT Physical Therapist                        Manual Rx (last 36 hours)      Manual Treatments       01/25/18 1700          Manual Rx 1    Manual Rx 1 Location Dry Needling:  Intramuscular manual therapy with DDN:    After reviewing all risks (including pneumothorax, bruising, infection, nerve injury, and soreness) written informed consent for dry needling was obtained.     Patient position during treatment: L side lying with pillow between knees    Muscles treated: R UT with pincer grasp    Response: several strong LTRs. Moderate to high pain response. Immediate significant release in tension noted.     Clean needle technique observed at all times, precautions for lung fields, neurovascular structures observed.      Manual palpation and assessment performed before, during, and after session.    Written after-care instructions issued.     Performed by Kena Samaniego PT, DPT.    -CK        User Key  (r) = Recorded By, (t) = Taken By, (c) = Cosigned By    Initials Name Provider Type    JONATHON Wayne PT Physical Therapist                PT OP Goals       01/25/18 1600       PT Short Term Goals    STG Date to Achieve 01/19/18  -CK     STG 1 Pt. will be independent and compliant with initial home exercise program.  -CK     STG 1 Progress Met  -CK     STG 2 Pt. will report R neck pain </=4-5/10 to increase ease of sitting to watch TV.   -CK     STG 2 Progress Ongoing  -CK     STG 2 Progress Comments still 5/10 today  -CK     STG 3 Pt. will report 20% faster falling asleep time due to neck pain.  -CK     STG 3 Progress Partially Met  -CK     STG 3 Progress Comments got a new pillow that is more supportive  -CK     Long Term Goals    LTG Date to Achieve 02/04/18  -CK     LTG 1 Pt. will be independent and compliant with advanced home exercise program.   -CK     LTG 1 Progress Ongoing  -CK     LTG 1 Progress Comments progressed today  -CK     LTG 2 Pt. will report R neck pain </= 2-3/10 to increase ease of sitting through meal.   -CK     LTG 2 Progress Ongoing  -CK     LTG 3 Pt. will score </=22% on NDI, indicating decreased perceived functinoal disability.  -CK     LTG 3 Progress Ongoing  -CK       User Key  (r) = Recorded By, (t) = Taken By, (c) = Cosigned By    Initials Name Provider Type    JONATHON Wayne PT Physical Therapist          Therapy Education  Education Details: updated HEP, reminded about tennis ball for self release against wall  Given: HEP  Program: Progressed  How Provided: Verbal, Demonstration, Written  Provided to: Patient  Level of Understanding: Teach back education performed, Verbalized              Time Calculation:   Start Time: 1545  Stop Time: 1630  Time Calculation (min): 45 min    Therapy Charges  for Today     Code Description Service Date Service Provider Modifiers Qty    94872300645 HC PT MANUAL THERAPY EA 15 MIN 1/25/2018 Cruz Wayne, PT GP 1    03173737225 HC PT HOT OR COLD PACK TREAT MCARE 1/25/2018 Cruz Wayne, PT GP 1    09758414059 HC PT THER PROC EA 15 MIN 1/25/2018 Cruz Wayne, PT GP 1                    Cruz Wayne, PT  1/25/2018

## 2018-02-01 ENCOUNTER — HOSPITAL ENCOUNTER (OUTPATIENT)
Dept: PHYSICAL THERAPY | Facility: HOSPITAL | Age: 58
Setting detail: THERAPIES SERIES
Discharge: HOME | End: 2018-02-01
Attending: NEUROLOGICAL SURGERY

## 2018-02-01 DIAGNOSIS — M54.2 CERVICALGIA: Primary | ICD-10-CM

## 2018-02-01 PROCEDURE — 97140 MANUAL THERAPY 1/> REGIONS: CPT | Performed by: PHYSICAL THERAPIST

## 2018-02-01 PROCEDURE — 97012 MECHANICAL TRACTION THERAPY: CPT | Performed by: PHYSICAL THERAPIST

## 2018-02-01 PROCEDURE — 97110 THERAPEUTIC EXERCISES: CPT | Performed by: PHYSICAL THERAPIST

## 2018-02-01 NOTE — THERAPY PROGRESS REPORT/RE-CERT
"    Outpatient Physical Therapy Ortho Progress Note  [facility]     Patient Name: Tracee Knutson  : 1960  MRN: [id number]  Today's Date: 2018      Visit Date: 2018    Patient Active Problem List   Diagnosis   • Cervicalgia        Past Medical History:   Diagnosis Date   • Depression    • Depression    • TIA (transient ischemic attack)         Past Surgical History:   Procedure Laterality Date   • MIDDLE EAR SURGERY         Visit Dx:     ICD-10-CM ICD-9-CM   1. Cervicalgia M54.2 723.1                                           PT Assessment/Plan       18 1406       PT Assessment    Assessment Comments Mrs. Knutson has been seen for 5 skilled therapy sessions since initiating treatment for chronic neck pain (R side > L). She reports improved awareness of posture since initiating sessions and initial decreased in R neck/upper trap symptoms. Patient reported adverse effects to trial of dry needling performed last session though stated she \"lifted something heavy\" the next day which made the muscle spasm/knot return. Reporting increased pain at start of session today. Manual therapy to decrease soft tissue tension after light scapular strengthening. Decreased \"tingling\" reported with manual cervical traction thus trial of mechanial traction initiated. Self scored Neck Disability Index was a 30% (initially 32%). She remains appropriate for skilled therapy at this time to assess response to traction. If no improvements would suggest return to MD for follow up/further testing/discussion of options for treatment.   -CK     PT Plan    PT Plan Comments Assess response to traction.  -CK       User Key  (r) = Recorded By, (t) = Taken By, (c) = Cosigned By    Initials Name Provider Type    JONATHON Wayne, PT Physical Therapist                Modalities       18 1100          Moist Heat    MH Applied Yes  -CK      Location underneath during cervical traction  -CK      Rx Minutes 15 mins  -CK      " Traction 69118    Traction Type Cervical  -CK      Rx Minutes 14  -CK      Duration Intermittent  -CK      Position Hook-lying  -CK      Weight 13   7  -CK      Hold 40  -CK      Relax 10  -CK        User Key  (r) = Recorded By, (t) = Taken By, (c) = Cosigned By    Initials Name Provider Type    JONATHON Wayne PT Physical Therapist              Exercises       02/01/18 1100          Subjective Comments    Subjective Comments I initially felt a tension release after the needling but then about a day later I went to  something heavy and the knot came back worse. I am having more pain now like I did at the beginning.   -CK      Subjective Pain    Able to rate subjective pain? yes  -CK      Pre-Treatment Pain Level 7  -CK      Post-Treatment Pain Level 6  -CK      Exercise 2    Exercise Name 2 Scapular retraction  -CK      Cueing 2 Verbal  -CK      Reps 2 15  -CK      Time (Seconds) 2 3  -CK      Additional Comments GTB  -CK      Exercise 3    Exercise Name 3 blue noodle thoracic stretch  -CK      Time (Minutes) 3 2  -CK      Additional Comments alternating arms x 20  -CK      Exercise 4    Exercise Name 4 shoulder ext  -CK      Cueing 4 Tactile;Verbal  -CK      Sets 4 2  -CK      Reps 4 10  -CK      Additional Comments GTB  -CK      Exercise 8    Exercise Name 8 Chin tucks  -CK      Reps 8 10  -CK      Time (Seconds) 8 5  -CK      Exercise 9    Exercise Name 9 B shoulder ER, on noodle  -CK      Reps 9 20  -CK      Additional Comments RTB  -CK      Exercise 10    Exercise Name 10 B shoulder horz abd  -CK      Reps 10 20  -CK      Additional Comments GTB  -CK        User Key  (r) = Recorded By, (t) = Taken By, (c) = Cosigned By    Initials Name Provider Type    JONATHON Wayne PT Physical Therapist           Manual Rx (last 36 hours)      Manual Treatments       02/01/18 1300          Manual Rx 1    Manual Rx 1 Location STM/MFR to B upper traps, levator, cervical paraspinals, rhomboids, and scapular tissue   -CK      Manual Rx 1 Type pt prone and supine  -CK      Manual Rx 1 Grade gentle manual cervical distraction as well as PA and lateral glides (multiple ranges of rotation)  -CK      Manual Rx 1 Duration Passive stretching to B upper traps and R levator  -CK      Manual Rx 2    Manual Rx 2 Location Total time- 15 min  -CK        User Key  (r) = Recorded By, (t) = Taken By, (c) = Cosigned By    Initials Name Provider Type    CK Cruz Wayne, PT Physical Therapist                                Time Calculation:   Start Time: 1100  Stop Time: 1145  Time Calculation (min): 45 min     Therapy Charges for Today     Code Description Service Date Service Provider Modifiers Qty    35319693296 HC PT MANUAL THERAPY EA 15 MIN 2/1/2018 Cruz Wayne, PT GP 1    72980693643 HC PT HOT OR COLD PACK TREAT MCARE 2/1/2018 Cruz Wayne, PT GP 1    42822096068 HC PT THER PROC EA 15 MIN 2/1/2018 Cruz Wayne, PT GP 1    92283343850 HC PT TRACTION CERVICAL 2/1/2018 Cruz Wayne, PT GP 1                    Cruz Wayne, PT  2/1/2018

## 2018-02-21 ENCOUNTER — OFFICE VISIT (OUTPATIENT)
Dept: NEUROSURGERY | Facility: CLINIC | Age: 58
End: 2018-02-21

## 2018-02-21 VITALS
SYSTOLIC BLOOD PRESSURE: 118 MMHG | BODY MASS INDEX: 26.03 KG/M2 | HEIGHT: 66 IN | WEIGHT: 162 LBS | DIASTOLIC BLOOD PRESSURE: 74 MMHG

## 2018-02-21 DIAGNOSIS — M54.2 CERVICALGIA: Primary | ICD-10-CM

## 2018-02-21 PROCEDURE — 99213 OFFICE O/P EST LOW 20 MIN: CPT | Performed by: NEUROLOGICAL SURGERY

## 2018-02-21 NOTE — PROGRESS NOTES
Subjective   Patient ID: Tracee Knutson is a 58 y.o. female who is here today for follow-up for neck pain. She presents unaccompanied.    History of Present Illness  Patient reports some response of PT.  Her neck pain is improved.  She is not taking nsaids or lyrica.  She denies improvement with lyrica or nsaids.      The following portions of the patient's history were reviewed and updated as appropriate: allergies, current medications, past family history, past medical history, past social history, past surgical history and problem list.    Review of Systems   Musculoskeletal: Positive for neck pain. Negative for arthralgias.   Neurological: Negative for numbness.       Objective   Physical Exam  Neurologic Exam     Strength   Right deltoid: 5/5  Left deltoid: 5/5  Right biceps: 5/5  Left biceps: 5/5  Right triceps: 5/5  Left triceps: 5/5  Right wrist flexion: 5/5  Left wrist flexion: 5/5  Right wrist extension: 5/5  Left wrist extension: 5/5  Right interossei: 5/5  Left interossei: 5/5     Reflexes   Right brachioradialis: 1+  Left brachioradialis: 1+  Right biceps: 1+  Left biceps: 1+  Right triceps: 1+  Left triceps: 1+  Right patellar: 1+  Left patellar: 1+  Right achilles: 1+  Left achilles: 1+     Sensory Exam   Right arm light touch: diminished around right deltoid.  Left arm light touch: normal  Right leg light touch: normal  Left leg light touch: normal  Right arm pinprick: diminished around right deltoid.  Left arm pinprick: normal  Right leg pinprick: normal  Left leg pinprick: normal     Assessment/Plan   Independent Review of Radiographic Studies:  Upper to mid cervical ddd.  Mild overall.    Medical Decision Making:  At this point she is improved but still troubled.  We will refer for cervical JUAN CARLOS's given her refractory lifestyle limiting pain.  I will refer her to a trusted colleague.  I will see her back in a few months .  We briefly discussed surgery and she wants to try the shots.  I will refer  her given her excellent response to depot steroids from Dr. Roberts.    Tracee was seen today for neck pain.    Diagnoses and all orders for this visit:    Cervicalgia    No Follow-up on file.

## 2018-03-15 ENCOUNTER — TELEPHONE (OUTPATIENT)
Dept: NEUROSURGERY | Facility: CLINIC | Age: 58
End: 2018-03-15

## 2018-03-15 NOTE — TELEPHONE ENCOUNTER
Spoke with patient on 3/7 and she will be out of the country until April.She will then call and get her injection rescheduled. Her auth for her injection is only good until 4/7.  She has an appt with Dr Wade on 4/25.  Need her date for her injection when she gets it rescheduled so I can get it authorized if after 4/7.

## 2018-03-23 ENCOUNTER — DOCUMENTATION (OUTPATIENT)
Dept: PHYSICAL THERAPY | Facility: HOSPITAL | Age: 58
End: 2018-03-23

## 2018-03-23 NOTE — THERAPY DISCHARGE NOTE
Outpatient Physical Therapy Discharge Summary         Patient Name: Tracee Knutson  : 1960  MRN: [id number]    Today's Date: 3/23/2018            PT OP Goals     Row Name 18 1800          PT Short Term Goals    STG Date to Achieve 18  -CK     STG 1 Pt. will be independent and compliant with initial home exercise program.  -CK     STG 1 Progress Met  -CK     STG 2 Pt. will report R neck pain </=4-5/10 to increase ease of sitting to watch TV.   -CK     STG 2 Progress Met  -CK     STG 3 Pt. will report 20% faster falling asleep time due to neck pain.  -CK     STG 3 Progress Partially Met  -CK        Long Term Goals    LTG Date to Achieve 18  -CK     LTG 1 Pt. will be independent and compliant with advanced home exercise program.   -CK     LTG 1 Progress Partially Met  -CK     LTG 2 Pt. will report R neck pain </= 2-3/10 to increase ease of sitting through meal.   -CK     LTG 2 Progress Partially Met  -CK     LTG 3 Pt. will score </=22% on NDI, indicating decreased perceived functinoal disability.  -CK     LTG 3 Progress Not Met  -CK     LTG 3 Progress Comments 30% at last assessment  -CK       User Key  (r) = Recorded By, (t) = Taken By, (c) = Cosigned By    Initials Name Provider Type    JONATHON Wayne, PT Physical Therapist          OP PT Discharge Summary  Date of Discharge: 18  Reason for Discharge: Lack of progress (Pt self discharged)  Outcomes Achieved: Patient able to partially acheive established goals  Discharge Destination: Home with home program  Discharge Instructions/Additional Comments: Ms. Knutson was seen for 5 skilled therapy sessions for chronic neck pain/radicular symptoms. She reported some improvement in daily functions but no lasting relief. She self discharged from further therapy services.       Time Calculation:                    Cruz Wayne, PT  3/23/2018

## 2018-10-17 ENCOUNTER — OFFICE VISIT (OUTPATIENT)
Dept: OBSTETRICS AND GYNECOLOGY | Facility: CLINIC | Age: 58
End: 2018-10-17

## 2018-10-17 VITALS
WEIGHT: 161 LBS | DIASTOLIC BLOOD PRESSURE: 80 MMHG | BODY MASS INDEX: 25.88 KG/M2 | SYSTOLIC BLOOD PRESSURE: 118 MMHG | HEIGHT: 66 IN

## 2018-10-17 DIAGNOSIS — Z01.419 ENCOUNTER FOR ANNUAL ROUTINE GYNECOLOGICAL EXAMINATION: ICD-10-CM

## 2018-10-17 DIAGNOSIS — N95.2 VAGINAL ATROPHY: Primary | ICD-10-CM

## 2018-10-17 PROCEDURE — 99213 OFFICE O/P EST LOW 20 MIN: CPT | Performed by: OBSTETRICS & GYNECOLOGY

## 2018-10-17 NOTE — PROGRESS NOTES
Subjective:    Patient Tracee Knutson is a 58 y.o. female.   Chief Complaint   Patient presents with   • Gynecologic Exam     AE, NO HYST, NON SMOKER       CC vaginal discomfort  HPI  Patient is had no periods no hot place symptoms does not want to take hormones    The following portions of the patient's history were reviewed and updated as appropriate: allergies, current medications, past family history, past medical history, past social history, past surgical history and problem list.      Review of Systems   Constitutional: Negative.    HENT: Negative.    Eyes: Negative.    Respiratory: Negative.    Cardiovascular: Negative.    Gastrointestinal: Negative for abdominal distention, abdominal pain, anal bleeding, blood in stool, constipation, diarrhea, nausea, rectal pain and vomiting.   Endocrine: Negative for cold intolerance, heat intolerance, polydipsia, polyphagia and polyuria.   Genitourinary: Negative.  Negative for decreased urine volume, dyspareunia, dysuria, enuresis, flank pain, frequency, genital sores, hematuria, menstrual problem, pelvic pain, urgency, vaginal bleeding, vaginal discharge and vaginal pain.   Musculoskeletal: Negative.    Skin: Negative.    Allergic/Immunologic: Negative.    Neurological: Negative.    Hematological: Negative for adenopathy. Does not bruise/bleed easily.   Psychiatric/Behavioral: Negative for agitation, confusion and sleep disturbance. The patient is not nervous/anxious.          Objective:      Physical Exam   Constitutional: She appears well-developed and well-nourished. She is not intubated.   HENT:   Head: Hair is normal.   Nose: Nose normal.   Mouth/Throat: Oropharynx is clear and moist.   Eyes: Conjunctivae are normal.   Neck: Normal carotid pulses and no JVD present. No tracheal tenderness, no spinous process tenderness and no muscular tenderness present. Carotid bruit is not present. No neck rigidity. No edema, no erythema and normal range of motion present. No  thyroid mass and no thyromegaly present.   Cardiovascular: Normal rate, regular rhythm, S1 normal and normal heart sounds.  Exam reveals no gallop.    No murmur heard.  Pulmonary/Chest: Effort normal. No accessory muscle usage or stridor. No apnea, no tachypnea and no bradypnea. She is not intubated. No respiratory distress. She has no wheezes. She has no rales. She exhibits no tenderness. Right breast exhibits no inverted nipple, no mass, no nipple discharge, no skin change and no tenderness. Left breast exhibits no inverted nipple, no mass, no nipple discharge, no skin change and no tenderness.   Abdominal: Soft. Bowel sounds are normal. She exhibits no distension and no mass. There is no tenderness. There is no rebound and no guarding. No hernia.   Genitourinary: Vagina normal and uterus normal. Rectal exam shows no external hemorrhoid, no internal hemorrhoid, no fissure, no mass, no tenderness and anal tone normal. There is no rash, tenderness, lesion or injury on the right labia. There is no rash, tenderness, lesion or injury on the left labia. Uterus is not deviated, not enlarged, not fixed and not tender. Cervix exhibits no motion tenderness, no discharge and no friability. Right adnexum displays no mass and no tenderness. Left adnexum displays no mass and no tenderness. No erythema, tenderness or bleeding in the vagina. No foreign body in the vagina. No signs of injury around the vagina. No vaginal discharge found.   Musculoskeletal: She exhibits no edema or tenderness.        Right shoulder: She exhibits no tenderness, no swelling, no pain and no spasm.   Lymphadenopathy:        Head (right side): No submental, no submandibular, no tonsillar, no preauricular, no posterior auricular and no occipital adenopathy present.        Head (left side): No submental, no submandibular, no tonsillar, no preauricular, no posterior auricular and no occipital adenopathy present.     She has no cervical adenopathy.         Right cervical: No superficial cervical, no deep cervical and no posterior cervical adenopathy present.       Left cervical: No superficial cervical, no deep cervical and no posterior cervical adenopathy present.        Right axillary: No pectoral and no lateral adenopathy present.        Left axillary: No pectoral and no lateral adenopathy present.       Right: No inguinal, no supraclavicular and no epitrochlear adenopathy present.        Left: No inguinal, no supraclavicular and no epitrochlear adenopathy present.   Neurological: No cranial nerve deficit. Coordination normal.   Skin: Skin is warm and dry. No abrasion, no bruising, no burn, no lesion, no petechiae, no purpura and no rash noted. Rash is not macular, not maculopapular, not nodular and not urticarial. No cyanosis or erythema. No pallor. Nails show no clubbing.   Psychiatric: She has a normal mood and affect. Her behavior is normal.         Assessment and Plan: Patient was told that she needed a mammogram only every 3 years reassured the patient that yearly mammograms are the safest is first picking up early breast cancer with except every other year but every 3 years of feel like is too long time she has significant vaginal atrophy hormone cream is discussed patient does not want to use it the second suggestion is a and D Ointment which does have some benefit but is not as good as the hormone cream    Patient has been instructed to perform a self breast exam on a weekly basis, a yearly mammogram, pap smear yearly unless instructed otherwise and bone density every 2 years.  I recommended that the patient not smoke, and discussed smoking cessation when appropriate.     There are no diagnoses linked to this encounter.

## 2018-10-19 LAB
CONV .: NORMAL
CYTOLOGIST CVX/VAG CYTO: NORMAL
CYTOLOGY CVX/VAG DOC THIN PREP: NORMAL
DX ICD CODE: NORMAL
HIV 1 & 2 AB SER-IMP: NORMAL
OTHER STN SPEC: NORMAL
PATH REPORT.FINAL DX SPEC: NORMAL
STAT OF ADQ CVX/VAG CYTO-IMP: NORMAL

## 2018-10-25 ENCOUNTER — TRANSCRIBE ORDERS (OUTPATIENT)
Dept: ADMINISTRATIVE | Facility: HOSPITAL | Age: 58
End: 2018-10-25

## 2018-10-25 DIAGNOSIS — Z12.31 VISIT FOR SCREENING MAMMOGRAM: Primary | ICD-10-CM

## 2018-12-24 ENCOUNTER — HOSPITAL ENCOUNTER (OUTPATIENT)
Dept: MAMMOGRAPHY | Facility: HOSPITAL | Age: 58
Discharge: HOME | End: 2018-12-24
Attending: OBSTETRICS & GYNECOLOGY | Admitting: OBSTETRICS & GYNECOLOGY

## 2018-12-24 DIAGNOSIS — Z12.31 VISIT FOR SCREENING MAMMOGRAM: ICD-10-CM

## 2018-12-24 PROCEDURE — 77067 SCR MAMMO BI INCL CAD: CPT

## 2018-12-24 PROCEDURE — 77063 BREAST TOMOSYNTHESIS BI: CPT

## 2020-08-26 PROBLEM — H81.13 BENIGN PAROXYSMAL POSITIONAL VERTIGO DUE TO BILATERAL VESTIBULAR DISORDER: Status: ACTIVE | Noted: 2018-12-11

## 2020-08-26 PROBLEM — F32.A DEPRESSION: Status: ACTIVE | Noted: 2020-08-26

## 2020-08-27 ENCOUNTER — OFFICE VISIT (OUTPATIENT)
Dept: INTERNAL MEDICINE | Facility: CLINIC | Age: 60
End: 2020-08-27

## 2020-08-27 VITALS
BODY MASS INDEX: 25.6 KG/M2 | HEART RATE: 69 BPM | WEIGHT: 158.6 LBS | DIASTOLIC BLOOD PRESSURE: 68 MMHG | SYSTOLIC BLOOD PRESSURE: 122 MMHG | OXYGEN SATURATION: 98 %

## 2020-08-27 DIAGNOSIS — R79.89 ABNORMAL LFTS: ICD-10-CM

## 2020-08-27 DIAGNOSIS — G89.29 CHRONIC LOW BACK PAIN, UNSPECIFIED BACK PAIN LATERALITY, UNSPECIFIED WHETHER SCIATICA PRESENT: ICD-10-CM

## 2020-08-27 DIAGNOSIS — Z00.00 ANNUAL PHYSICAL EXAM: Primary | ICD-10-CM

## 2020-08-27 DIAGNOSIS — M51.37 DEGENERATION OF LUMBAR OR LUMBOSACRAL INTERVERTEBRAL DISC: ICD-10-CM

## 2020-08-27 DIAGNOSIS — M54.50 CHRONIC LOW BACK PAIN, UNSPECIFIED BACK PAIN LATERALITY, UNSPECIFIED WHETHER SCIATICA PRESENT: ICD-10-CM

## 2020-08-27 DIAGNOSIS — F43.10 PTSD (POST-TRAUMATIC STRESS DISORDER): ICD-10-CM

## 2020-08-27 DIAGNOSIS — R68.82 LIBIDO, DECREASED: ICD-10-CM

## 2020-08-27 DIAGNOSIS — Z13.220 LIPID SCREENING: ICD-10-CM

## 2020-08-27 DIAGNOSIS — Z13.29 THYROID DISORDER SCREEN: ICD-10-CM

## 2020-08-27 DIAGNOSIS — F33.42 RECURRENT MAJOR DEPRESSIVE DISORDER, IN FULL REMISSION (HCC): ICD-10-CM

## 2020-08-27 PROBLEM — M54.2 CERVICALGIA: Status: RESOLVED | Noted: 2017-11-09 | Resolved: 2020-08-27

## 2020-08-27 PROCEDURE — 36415 COLL VENOUS BLD VENIPUNCTURE: CPT | Performed by: FAMILY MEDICINE

## 2020-08-27 PROCEDURE — 99396 PREV VISIT EST AGE 40-64: CPT | Performed by: FAMILY MEDICINE

## 2020-08-27 RX ORDER — SERTRALINE HCL 100 MG
100 TABLET ORAL DAILY
Qty: 90 TABLET | Refills: 3 | Status: SHIPPED | OUTPATIENT
Start: 2020-08-27 | End: 2021-09-27 | Stop reason: SDUPTHER

## 2020-08-27 RX ORDER — CETIRIZINE HYDROCHLORIDE 10 MG/1
10 TABLET ORAL DAILY
COMMUNITY
End: 2023-03-13

## 2020-08-27 RX ORDER — UREA 10 %
LOTION (ML) TOPICAL
COMMUNITY

## 2020-08-27 NOTE — PROGRESS NOTES
Date of Encounter: 2020  Patient: Tracee Knutson,  1960    Subjective   History of Presenting Illness  Chief complaint: Annual physical    No acute concerns.    PTSD, recurrent major depressive disorder: Survivor from [place] war, has suffered from chronic depression well-controlled on brand name Zoloft.  When on this medication should become suicidal, and has tried to come off of it several times.  Only side effect is low libido which her and her  have essentially made peace with.    Chronic lower back pain: Controlled with acupuncture as needed.  From standing on hard floors all day at work.    History of abnormal LFTs.    BPPV concern was for TIA with normal neurologic work-up.    Lives with .  Not currently sexually active.  Never smoker, minimal alcohol use.  Does not exercise but walks over 10,000 steps per day at her job at [employer].  Mammogram next month, last Pap smear , colonoscopy 7 years ago without polyps.  Recommend Shingrix, tetanus, influenza vaccination.    Review of Systems:  Negative for fever, congestion, chest pain upon exertion, shortness of breath, vision changes, vomiting, dysuria, lymphadenopathy, muscle weakness, numbness, mood changes, rashes.    The following portions of the patient's history were reviewed and updated as appropriate: allergies, current medications, past family history, past medical history, past social history, past surgical history and problem list.    Patient Active Problem List   Diagnosis   • Benign paroxysmal positional vertigo due to bilateral vestibular disorder   • Degeneration of lumbar or lumbosacral intervertebral disc   • Depression   • Chronic low back pain   • Libido, decreased, due to medication     Past Medical History:   Diagnosis Date   • Cervicalgia 2017   • Depression    • Depression    • TIA (transient ischemic attack)      Past Surgical History:   Procedure Laterality Date   • MIDDLE EAR SURGERY       Family History    Problem Relation Age of Onset   • Depression Mother    • Heart disease Mother    • Heart disease Paternal Grandfather    • Heart disease Paternal Grandmother    • Heart disease Maternal Grandmother    • Heart disease Maternal Grandfather        Current Outpatient Medications:   •  cetirizine (zyrTEC) 10 MG tablet, Take 10 mg by mouth Daily., Disp: , Rfl:   •  melatonin 1 MG tablet, Take  by mouth., Disp: , Rfl:   •  ZOLOFT 100 MG tablet, Take 1 tablet by mouth Daily., Disp: 90 tablet, Rfl: 3  Allergies   Allergen Reactions   • Penicillins      Social History     Tobacco Use   • Smoking status: Never Smoker   • Smokeless tobacco: Never Used   Substance Use Topics   • Alcohol use: No   • Drug use: No          Objective   Physical Exam  Vitals:    08/27/20 1004   BP: 122/68   Pulse: 69   SpO2: 98%   Weight: 71.9 kg (158 lb 9.6 oz)     Body mass index is 25.6 kg/m².    Constitutional: NAD.  Eyes: EOMI. PERRLA. Normal conjunctiva.  Ear, nose, mouth, throat: No tonsillar exudates or erythema.   Normal nasal mucosa. Normal external ear canals and TMs bilaterally.  Cardiovascular: RRR. No murmurs. No LE edema b/l. Radial pulses 2+ bilaterally.  Pulmonary: CTA b/l. Good effort.  Integumentary: No rashes or wounds on face or upper extremities.  Lymphatic: No anterior cervical lymphadenopathy.  Endocrine: No thyromegaly or palpable thyroid nodules.  Psychiatric: Normal affect. Normal thought content.     Assessment/Plan   Assessment and Plan  Very pleasant 60-year-old female with PTSD and recurrent major depressive disorder, low libido, chronic lower back pain, history of abnormal LFTs, who presents with the following    Diagnoses and all orders for this visit:    1. Annual physical exam (Primary):We discussed preventative care including age and patient-appropriate immunizations and cancer screening. We also discussed the importance of exercise and a healthy diet. This is their annual preventative exam.  Recommend influenza,  Shingrix, Td vaccinations.  Encouraged dedicated exercise, not just activity at work.  -     CBC & Differential  -     Comprehensive Metabolic Panel  -     Lipid Panel  -     Thyroid Panel With TSH  -     Urinalysis With Microscopic - Urine, Clean Catch    2. Recurrent major depressive disorder, in full remission (CMS/HCC): Continue brand-name Zoloft.  Did discuss bupropion for decreased libido but she is not interested at this time.  -     ZOLOFT 100 MG tablet; Take 1 tablet by mouth Daily.  Dispense: 90 tablet; Refill: 3  3. PTSD (post-traumatic stress disorder)  -     ZOLOFT 100 MG tablet; Take 1 tablet by mouth Daily.  Dispense: 90 tablet; Refill: 3    4. Degeneration of lumbar or lumbosacral intervertebral disc: Stable  5. Chronic low back pain, unspecified back pain laterality, unspecified whether sciatica present    6. Libido, decreased, due to medication: Per #2    7. Lipid screening  -     Lipid Panel    8. Abnormal LFTs  -     CBC & Differential  -     Urinalysis With Microscopic - Urine, Clean Catch    9. Thyroid disorder screen  -     Thyroid Panel With TSH    Return to office in 1 year for annual physical or earlier as needed.    Louis Guerrero MD  Family Medicine  O: [phone]  C: [phone]    Disclaimer: Parts of this note were dictated by speech recognition. Minor errors in transcription may be present. Please call if questions.

## 2020-08-28 LAB
ALBUMIN SERPL-MCNC: 4.3 G/DL (ref 3.8–4.8)
ALBUMIN/GLOB SERPL: 1.4 {RATIO} (ref 1.2–2.2)
ALP SERPL-CCNC: 74 IU/L (ref 39–117)
ALT SERPL-CCNC: 20 IU/L (ref 0–32)
APPEARANCE UR: CLEAR
AST SERPL-CCNC: 21 IU/L (ref 0–40)
BACTERIA #/AREA URNS HPF: NORMAL /[HPF]
BASOPHILS # BLD AUTO: 0 X10E3/UL (ref 0–0.2)
BASOPHILS NFR BLD AUTO: 1 %
BILIRUB SERPL-MCNC: 0.4 MG/DL (ref 0–1.2)
BILIRUB UR QL STRIP: NEGATIVE
BUN SERPL-MCNC: 16 MG/DL (ref 8–27)
BUN/CREAT SERPL: 22 (ref 12–28)
CALCIUM SERPL-MCNC: 9.5 MG/DL (ref 8.7–10.3)
CHLORIDE SERPL-SCNC: 102 MMOL/L (ref 96–106)
CHOLEST SERPL-MCNC: 243 MG/DL (ref 100–199)
CO2 SERPL-SCNC: 26 MMOL/L (ref 20–29)
COLOR UR: YELLOW
CREAT SERPL-MCNC: 0.72 MG/DL (ref 0.57–1)
EOSINOPHIL # BLD AUTO: 0.1 X10E3/UL (ref 0–0.4)
EOSINOPHIL NFR BLD AUTO: 2 %
EPI CELLS #/AREA URNS HPF: NORMAL /HPF (ref 0–10)
ERYTHROCYTE [DISTWIDTH] IN BLOOD BY AUTOMATED COUNT: 12.1 % (ref 11.7–15.4)
FT4I SERPL CALC-MCNC: 1.5 (ref 1.2–4.9)
GLOBULIN SER CALC-MCNC: 3 G/DL (ref 1.5–4.5)
GLUCOSE SERPL-MCNC: 90 MG/DL (ref 65–99)
GLUCOSE UR QL: NEGATIVE
HCT VFR BLD AUTO: 41.8 % (ref 34–46.6)
HDLC SERPL-MCNC: 57 MG/DL
HGB BLD-MCNC: 14.3 G/DL (ref 11.1–15.9)
HGB UR QL STRIP: ABNORMAL
IMM GRANULOCYTES # BLD AUTO: 0 X10E3/UL (ref 0–0.1)
IMM GRANULOCYTES NFR BLD AUTO: 0 %
KETONES UR QL STRIP: NEGATIVE
LDLC SERPL CALC-MCNC: 159 MG/DL (ref 0–99)
LEUKOCYTE ESTERASE UR QL STRIP: NEGATIVE
LYMPHOCYTES # BLD AUTO: 2.1 X10E3/UL (ref 0.7–3.1)
LYMPHOCYTES NFR BLD AUTO: 41 %
MCH RBC QN AUTO: 31.3 PG (ref 26.6–33)
MCHC RBC AUTO-ENTMCNC: 34.2 G/DL (ref 31.5–35.7)
MCV RBC AUTO: 92 FL (ref 79–97)
MICRO URNS: ABNORMAL
MONOCYTES # BLD AUTO: 0.5 X10E3/UL (ref 0.1–0.9)
MONOCYTES NFR BLD AUTO: 10 %
MUCOUS THREADS URNS QL MICRO: PRESENT
NEUTROPHILS # BLD AUTO: 2.4 X10E3/UL (ref 1.4–7)
NEUTROPHILS NFR BLD AUTO: 46 %
NITRITE UR QL STRIP: NEGATIVE
PH UR STRIP: 7 [PH] (ref 5–7.5)
PLATELET # BLD AUTO: 229 X10E3/UL (ref 150–450)
POTASSIUM SERPL-SCNC: 4.7 MMOL/L (ref 3.5–5.2)
PROT SERPL-MCNC: 7.3 G/DL (ref 6–8.5)
PROT UR QL STRIP: NEGATIVE
RBC # BLD AUTO: 4.57 X10E6/UL (ref 3.77–5.28)
RBC #/AREA URNS HPF: NORMAL /HPF (ref 0–2)
SODIUM SERPL-SCNC: 142 MMOL/L (ref 134–144)
SP GR UR: 1.02 (ref 1–1.03)
T3RU NFR SERPL: 25 % (ref 24–39)
T4 SERPL-MCNC: 5.8 UG/DL (ref 4.5–12)
TRIGL SERPL-MCNC: 137 MG/DL (ref 0–149)
TSH SERPL DL<=0.005 MIU/L-ACNC: 1.59 UIU/ML (ref 0.45–4.5)
UROBILINOGEN UR STRIP-MCNC: 0.2 MG/DL (ref 0.2–1)
VLDLC SERPL CALC-MCNC: 27 MG/DL (ref 5–40)
WBC # BLD AUTO: 5.3 X10E3/UL (ref 3.4–10.8)
WBC #/AREA URNS HPF: NORMAL /HPF (ref 0–5)

## 2020-08-31 PROBLEM — E78.5 HYPERLIPIDEMIA: Status: ACTIVE | Noted: 2020-08-31

## 2020-08-31 NOTE — PROGRESS NOTES
Discussed the results over the phone with the patient as previously agreed.    The 10-year ASCVD risk score (Alessandra BRAVO Jr., et al., 2013) is: 3.8%    Values used to calculate the score:      Age: 60 years      Sex: Female      Is Non- : No      Diabetic: No      Tobacco smoker: No      Systolic Blood Pressure: 122 mmHg      Is BP treated: No      HDL Cholesterol: 57 mg/dL      Total Cholesterol: 243 mg/dL

## 2020-09-22 ENCOUNTER — APPOINTMENT (OUTPATIENT)
Dept: WOMENS IMAGING | Facility: HOSPITAL | Age: 60
End: 2020-09-22

## 2020-09-22 PROCEDURE — 77067 SCR MAMMO BI INCL CAD: CPT | Performed by: RADIOLOGY

## 2020-11-03 ENCOUNTER — FLU SHOT (OUTPATIENT)
Dept: INTERNAL MEDICINE | Facility: CLINIC | Age: 60
End: 2020-11-03

## 2020-11-03 DIAGNOSIS — Z23 NEED FOR INFLUENZA VACCINATION: ICD-10-CM

## 2020-11-03 PROCEDURE — 90471 IMMUNIZATION ADMIN: CPT | Performed by: FAMILY MEDICINE

## 2020-11-03 PROCEDURE — 90686 IIV4 VACC NO PRSV 0.5 ML IM: CPT | Performed by: FAMILY MEDICINE

## 2020-12-24 PROBLEM — M85.80 OSTEOPENIA: Status: ACTIVE | Noted: 2020-12-24

## 2021-01-05 ENCOUNTER — OFFICE VISIT (OUTPATIENT)
Dept: INTERNAL MEDICINE | Facility: CLINIC | Age: 61
End: 2021-01-05

## 2021-01-05 VITALS
TEMPERATURE: 97.5 F | OXYGEN SATURATION: 98 % | SYSTOLIC BLOOD PRESSURE: 114 MMHG | DIASTOLIC BLOOD PRESSURE: 74 MMHG | BODY MASS INDEX: 25.7 KG/M2 | WEIGHT: 159.2 LBS | HEART RATE: 73 BPM

## 2021-01-05 DIAGNOSIS — R10.9 CHRONIC RIGHT FLANK PAIN: Primary | ICD-10-CM

## 2021-01-05 DIAGNOSIS — G89.29 CHRONIC RIGHT FLANK PAIN: Primary | ICD-10-CM

## 2021-01-05 DIAGNOSIS — N28.1 RENAL CYST: ICD-10-CM

## 2021-01-05 DIAGNOSIS — B35.1 ONYCHOMYCOSIS: ICD-10-CM

## 2021-01-05 PROCEDURE — 99214 OFFICE O/P EST MOD 30 MIN: CPT | Performed by: FAMILY MEDICINE

## 2021-01-05 NOTE — PROGRESS NOTES
Date of Encounter: 2021  Patient: Tracee Knutson,  1960    Subjective   History of Presenting Illness  Chief complaint: Flank pain    6 months of intermittent, crampy flank pain, occurring 1-2 times per week and increasing in frequency.  Last for several minutes to hours during these times.  Denies hematuria, abdominal pain, nausea, vomiting, dysuria.  Distant history of nephrolithiasis, feels the symptoms are very similar.  Also has a history of benign renal cyst.    Onychomycosis of right second finger, using over-the-counter medication that is not improving.    Never smoker    Review of Systems:  Negative for fever, cough, shortness of breath, nausea, vomiting, abdominal pain    The following portions of the patient's history were reviewed and updated as appropriate: allergies, current medications, past family history, past medical history, past social history, past surgical history and problem list.    Patient Active Problem List   Diagnosis   • Benign paroxysmal positional vertigo due to bilateral vestibular disorder   • Degeneration of lumbar or lumbosacral intervertebral disc   • Depression   • Chronic low back pain   • Libido, decreased, due to medication   • Hyperlipidemia   • Osteopenia   • Chronic right flank pain     Past Medical History:   Diagnosis Date   • Cervicalgia 2017   • Depression    • Depression    • TIA (transient ischemic attack)      Past Surgical History:   Procedure Laterality Date   • MIDDLE EAR SURGERY       Family History   Problem Relation Age of Onset   • Depression Mother    • Heart disease Mother    • Heart disease Paternal Grandfather    • Heart disease Paternal Grandmother    • Heart disease Maternal Grandmother    • Heart disease Maternal Grandfather        Current Outpatient Medications:   •  cetirizine (zyrTEC) 10 MG tablet, Take 10 mg by mouth Daily., Disp: , Rfl:   •  melatonin 1 MG tablet, Take  by mouth., Disp: , Rfl:   •  ZOLOFT 100 MG tablet, Take 1  tablet by mouth Daily., Disp: 90 tablet, Rfl: 3  •  ciclopirox (PENLAC) 8 % solution, Apply  topically to the appropriate area as directed Every Night., Disp: 6 mL, Rfl: 0  Allergies   Allergen Reactions   • Penicillins      Social History     Tobacco Use   • Smoking status: Never Smoker   • Smokeless tobacco: Never Used   Substance Use Topics   • Alcohol use: No   • Drug use: No          Objective   Physical Exam  Vitals:    01/05/21 1100   BP: 114/74   Pulse: 73   Temp: 97.5 °F (36.4 °C)   TempSrc: Temporal   SpO2: 98%   Weight: 72.2 kg (159 lb 3.2 oz)     Body mass index is 25.7 kg/m².    Constitutional: NAD.  Eyes: Normal conjunctiva.  Integumentary: Onychomycosis of the medial aspect of the right second digit nail plate from prior nail avulsion injury.  Psychiatric: Normal affect. Normal thought content.  Gastrointestinal: Nondistended.  Negative Hernandez sign. No hepatosplenomegaly. No focal tenderness to palpation. Normal bowel sounds.  No right CVA tenderness to percussion.  Musculoskeletal: No tenderness to palpation of the thoracolumbar spine.  No tenderness to palpation of the right paraspinal muscles.       Assessment/Plan   Assessment and Plan  Very pleasant 60-year-old female with PTSD and recurrent major depressive disorder, low libido, chronic lower back pain, history of abnormal LFTs, who presents with the following    Diagnoses and all orders for this visit:    1. Chronic right flank pain (Primary)  -     Urinalysis With Microscopic - Urine, Clean Catch  -     CT Abdomen Kidney With & Without Contrast; Future  2. Renal cyst  -     CT Abdomen Kidney With & Without Contrast; Future    Examination is benign, symptoms however are consistent with possible nephrolithiasis, or pain from enlarging renal cyst.  There is no evidence of obstructing calculi on 4/2018 CT abdomen pelvis.  Largest renal cyst was 3.7 cm on the left, dimensions of right renal cyst were not noted on imaging report.  Discussed reasons  to go to the hospital.    3. Onychomycosis  -     ciclopirox (PENLAC) 8 % solution; Apply  topically to the appropriate area as directed Every Night.  Dispense: 6 mL; Refill: 0    Louis Guerrero MD  Family Medicine  O: [phone]  C: [phone]    Disclaimer: Parts of this note were dictated by speech recognition. Minor errors in transcription may be present. Please call if questions.

## 2021-01-06 ENCOUNTER — TELEPHONE (OUTPATIENT)
Dept: INTERNAL MEDICINE | Facility: CLINIC | Age: 61
End: 2021-01-06

## 2021-01-06 LAB
APPEARANCE UR: CLEAR
BACTERIA #/AREA URNS HPF: ABNORMAL /[HPF]
BILIRUB UR QL STRIP: NEGATIVE
COLOR UR: YELLOW
CRYSTALS URNS MICRO: ABNORMAL
EPI CELLS #/AREA URNS HPF: ABNORMAL /HPF (ref 0–10)
GLUCOSE UR QL: NEGATIVE
HGB UR QL STRIP: ABNORMAL
KETONES UR QL STRIP: NEGATIVE
LEUKOCYTE ESTERASE UR QL STRIP: ABNORMAL
MICRO URNS: ABNORMAL
MUCOUS THREADS URNS QL MICRO: PRESENT
NITRITE UR QL STRIP: NEGATIVE
PH UR STRIP: 5 [PH] (ref 5–7.5)
PROT UR QL STRIP: NEGATIVE
RBC #/AREA URNS HPF: ABNORMAL /HPF (ref 0–2)
SP GR UR: 1.02 (ref 1–1.03)
UNIDENT CRYS URNS QL MICRO: PRESENT
UROBILINOGEN UR STRIP-MCNC: 0.2 MG/DL (ref 0.2–1)
WBC #/AREA URNS HPF: ABNORMAL /HPF (ref 0–5)

## 2021-01-06 NOTE — TELEPHONE ENCOUNTER
S/w pt re: lab results. Pt was advised to go to ER if pain worsens, but for now we will wait for CT results. Pt demonstrated understanding.

## 2021-01-06 NOTE — TELEPHONE ENCOUNTER
----- Message from Louis Guerrero MD sent at 1/6/2021  2:02 PM EST -----  Please call the patient about their results with the following discussion points:    She does have some blood in her urine and crystals that we sometimes see with kidney stones    If her pain is worsening at all, she needs to go to the ER, otherwise we can wait for her CT that we scheduled at her appointment

## 2021-01-14 DIAGNOSIS — R91.1 INCIDENTAL LUNG NODULE, > 3MM AND < 8MM: Primary | ICD-10-CM

## 2021-01-14 NOTE — PROGRESS NOTES
After several attempts to call patient without success, I left a voice mail discussing the results as previously agreed.    No obvious nephrolithiasis  Noncalcified pulmonary nodules incompletely characterized, will schedule CT to evaluate  If you still having abdominal pain recommend return to discuss potential further evaluation

## 2021-01-29 ENCOUNTER — TELEPHONE (OUTPATIENT)
Dept: INTERNAL MEDICINE | Facility: CLINIC | Age: 61
End: 2021-01-29

## 2021-01-29 NOTE — TELEPHONE ENCOUNTER
S/w pt re: CT chest results. Pt was advised that lung nodules appear small and possibly benign, but that we will monitor in 1 year to make sure they stay the same size. Pt verbalized understanding.

## 2021-01-29 NOTE — TELEPHONE ENCOUNTER
Caller: Tracee Knutson    Relationship: Self    Best call back number: [phone]    What test was performed: CHEST  CT    When was the test performed: 1/27/2021    Where was the test performed: [facility]

## 2021-01-29 NOTE — TELEPHONE ENCOUNTER
----- Message from Louis Guerrero MD sent at 1/28/2021  4:16 PM EST -----  Please call the patient about their results with the following discussion points:    The good news is that the CT scan of your lungs showed that the nodules were very small and likely benign.  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

## 2021-03-19 ENCOUNTER — BULK ORDERING (OUTPATIENT)
Dept: CASE MANAGEMENT | Facility: OTHER | Age: 61
End: 2021-03-19

## 2021-03-19 DIAGNOSIS — Z23 IMMUNIZATION DUE: ICD-10-CM

## 2021-09-27 DIAGNOSIS — F33.42 RECURRENT MAJOR DEPRESSIVE DISORDER, IN FULL REMISSION (HCC): ICD-10-CM

## 2021-09-27 DIAGNOSIS — F43.10 PTSD (POST-TRAUMATIC STRESS DISORDER): ICD-10-CM

## 2021-09-27 RX ORDER — SERTRALINE HCL 100 MG
TABLET ORAL
Qty: 90 TABLET | Refills: 0 | OUTPATIENT
Start: 2021-09-27

## 2021-09-27 RX ORDER — SERTRALINE HCL 100 MG
100 TABLET ORAL DAILY
Qty: 90 TABLET | Refills: 3 | Status: SHIPPED | OUTPATIENT
Start: 2021-09-27 | End: 2021-09-27 | Stop reason: SDUPTHER

## 2021-09-27 RX ORDER — SERTRALINE HCL 100 MG
100 TABLET ORAL DAILY
Qty: 10 TABLET | Refills: 0 | Status: SHIPPED | OUTPATIENT
Start: 2021-09-27 | End: 2022-01-10

## 2021-09-27 NOTE — TELEPHONE ENCOUNTER
Caller: Tracee Knutson    Relationship: Self      Medication requested (name and dosage): ZOLOFT 100 MG tablet  Pharmacy where request should be sent: [id number] [address] [place] [address] [phone]  - [phone]      Additional details provided by patient: PATIENT IS ON VACATION AND SHE FORGOT HER MEDICATION AT HOME. SHE NEEDS 8 PILLS CALLED IN TO DO HER UNTIL SHE RETURNS HOME.    Best call back number: [phone]  Does the patient have less than a 3 day supply:  [] Yes  [] No    Teressa Mena Rep   09/27/21 10:18 EDT

## 2021-09-27 NOTE — TELEPHONE ENCOUNTER
Patient called back to check status and to advise she only needs 8 days worth as she left her prescription in [place] and is on vacation in [place].  Please advise.

## 2021-10-18 ENCOUNTER — APPOINTMENT (OUTPATIENT)
Dept: WOMENS IMAGING | Facility: HOSPITAL | Age: 61
End: 2021-10-18

## 2021-10-18 PROCEDURE — 77063 BREAST TOMOSYNTHESIS BI: CPT | Performed by: RADIOLOGY

## 2021-10-18 PROCEDURE — 77067 SCR MAMMO BI INCL CAD: CPT | Performed by: RADIOLOGY

## 2022-01-08 DIAGNOSIS — F33.42 RECURRENT MAJOR DEPRESSIVE DISORDER, IN FULL REMISSION: ICD-10-CM

## 2022-01-08 DIAGNOSIS — F43.10 PTSD (POST-TRAUMATIC STRESS DISORDER): ICD-10-CM

## 2022-01-10 RX ORDER — SERTRALINE HCL 100 MG
TABLET ORAL
Qty: 90 TABLET | Refills: 0 | Status: SHIPPED | OUTPATIENT
Start: 2022-01-10 | End: 2022-03-03

## 2022-01-11 ENCOUNTER — TELEPHONE (OUTPATIENT)
Dept: INTERNAL MEDICINE | Facility: CLINIC | Age: 62
End: 2022-01-11

## 2022-01-11 DIAGNOSIS — E78.5 HYPERLIPIDEMIA, UNSPECIFIED HYPERLIPIDEMIA TYPE: Primary | ICD-10-CM

## 2022-01-11 NOTE — TELEPHONE ENCOUNTER
Caller: Tracee Knutson    Relationship: Self    Best call back number: [phone]    What orders are you requesting (i.e. lab or imaging): LABS    In what timeframe would the patient need to come in: ASAP    Where will you receive your lab/imaging services: IN OFFICE    Additional notes: PATIENT STATES SHE WOULD LIKE TO HAVE LABS DONE. PLEASE CALL TO SCHEDULE

## 2022-01-13 NOTE — TELEPHONE ENCOUNTER
Patient is aware, she has a annual physical on 3/3/22. She has scheduled a lab appointment for earlier that day.    Can you please place orders for labs?    Thanks!

## 2022-03-03 ENCOUNTER — OFFICE VISIT (OUTPATIENT)
Dept: INTERNAL MEDICINE | Facility: CLINIC | Age: 62
End: 2022-03-03

## 2022-03-03 VITALS
DIASTOLIC BLOOD PRESSURE: 60 MMHG | HEART RATE: 71 BPM | OXYGEN SATURATION: 97 % | WEIGHT: 153.1 LBS | BODY MASS INDEX: 24.71 KG/M2 | SYSTOLIC BLOOD PRESSURE: 108 MMHG

## 2022-03-03 DIAGNOSIS — K21.9 GASTROESOPHAGEAL REFLUX DISEASE WITHOUT ESOPHAGITIS: ICD-10-CM

## 2022-03-03 DIAGNOSIS — M54.50 CHRONIC LOW BACK PAIN, UNSPECIFIED BACK PAIN LATERALITY, UNSPECIFIED WHETHER SCIATICA PRESENT: ICD-10-CM

## 2022-03-03 DIAGNOSIS — F43.10 PTSD (POST-TRAUMATIC STRESS DISORDER): ICD-10-CM

## 2022-03-03 DIAGNOSIS — Z13.89 SCREENING FOR BLOOD OR PROTEIN IN URINE: ICD-10-CM

## 2022-03-03 DIAGNOSIS — F33.42 RECURRENT MAJOR DEPRESSIVE DISORDER, IN FULL REMISSION: ICD-10-CM

## 2022-03-03 DIAGNOSIS — E78.5 HYPERLIPIDEMIA, UNSPECIFIED HYPERLIPIDEMIA TYPE: ICD-10-CM

## 2022-03-03 DIAGNOSIS — Z00.00 ANNUAL PHYSICAL EXAM: Primary | ICD-10-CM

## 2022-03-03 DIAGNOSIS — G89.29 CHRONIC LOW BACK PAIN, UNSPECIFIED BACK PAIN LATERALITY, UNSPECIFIED WHETHER SCIATICA PRESENT: ICD-10-CM

## 2022-03-03 DIAGNOSIS — M85.80 OSTEOPENIA, UNSPECIFIED LOCATION: ICD-10-CM

## 2022-03-03 PROCEDURE — 99396 PREV VISIT EST AGE 40-64: CPT | Performed by: FAMILY MEDICINE

## 2022-03-03 RX ORDER — PANTOPRAZOLE SODIUM 40 MG/1
TABLET, DELAYED RELEASE ORAL
Qty: 90 TABLET | Refills: 1 | Status: SHIPPED | OUTPATIENT
Start: 2022-03-03 | End: 2023-03-13 | Stop reason: SDUPTHER

## 2022-03-03 RX ORDER — SERTRALINE HCL 100 MG
TABLET ORAL
Qty: 90 TABLET | Refills: 3 | Status: SHIPPED | OUTPATIENT
Start: 2022-03-03 | End: 2022-05-16

## 2022-03-03 NOTE — PROGRESS NOTES
Date of Encounter: 2022  Patient: Tracee Knutson,  1960    Subjective   History of Presenting Illness  Chief complaint: Annual physical    GERD: She has had this problem previously but has never had to take medication for this.  Unfortunately over the past month she has been having heartburn symptoms with epigastric discomfort, substernal burning with most meals.  After eliminating almost every trigger from her diet and eating a very bland diet, her symptoms do resolve but this is not sustainable.  She did have an EGD during her last colonoscopy in  that was reportedly normal.  He does not have any blood in her stool, she does not vomit.     PTSD, recurrent major depressive disorder: Survivor from [place] war, has suffered from chronic depression well-controlled on brand name Zoloft.  When on this medication should become suicidal, and has tried to come off of it several times.  She recently tried to reduce this dose and is currently taking 75 mg which has actually worked very well with no suicidal ideation. Only side effect is low libido which her and her  have essentially made peace with.     Chronic lower back pain: Controlled with acupuncture as needed.  From standing on hard floors all day at work.     BPPV concern was for TIA with normal neurologic work-up.    Osteopenia followed by gynecology last DEXA .    She has not had any recurrent kidney stone like episodes since our last appointment.     Lives with .  Not currently sexually active.  Never smoker, minimal alcohol use.  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  Mammogram , EGD and colonoscopy  with 10-year follow-up, last Pap smear .  DEXA scan . Recommend Shingrix, tetanus, influenza vaccination at her leisure which she declines today.     Review of Systems:  Negative for fever, congestion, chest pain upon exertion, shortness of breath, vision changes,  vomiting, dysuria, lymphadenopathy, muscle weakness, numbness, mood changes, rashes.    The following portions of the patient's history were reviewed and updated as appropriate: allergies, current medications, past family history, past medical history, past social history, past surgical history and problem list.    Patient Active Problem List   Diagnosis   • Benign paroxysmal positional vertigo due to bilateral vestibular disorder   • Degeneration of lumbar or lumbosacral intervertebral disc   • Depression   • Chronic low back pain   • Libido, decreased, due to medication   • Hyperlipidemia   • Osteopenia   • Chronic right flank pain   • Renal cyst   • Incidental lung nodule, > 3mm and < 8mm   • Gastroesophageal reflux disease without esophagitis     Past Medical History:   Diagnosis Date   • Cervicalgia 11/9/2017   • Depression    • Depression    • TIA (transient ischemic attack)      Past Surgical History:   Procedure Laterality Date   • MIDDLE EAR SURGERY       Family History   Problem Relation Age of Onset   • Depression Mother    • Heart disease Mother    • Heart disease Paternal Grandfather    • Heart disease Paternal Grandmother    • Heart disease Maternal Grandmother    • Heart disease Maternal Grandfather        Current Outpatient Medications:   •  melatonin 1 MG tablet, Take  by mouth., Disp: , Rfl:   •  Zoloft 100 MG tablet, Take 75mg once daily, Disp: 90 tablet, Rfl: 3  •  cetirizine (zyrTEC) 10 MG tablet, Take 10 mg by mouth Daily., Disp: , Rfl:   •  ciclopirox (PENLAC) 8 % solution, Apply  topically to the appropriate area as directed Every Night., Disp: 6 mL, Rfl: 0  •  pantoprazole (PROTONIX) 40 MG EC tablet, Take 1 tablet daily, and after 2-3 months if symptoms have improved wean off slowly, Disp: 90 tablet, Rfl: 1  Allergies   Allergen Reactions   • Penicillins      Social History     Tobacco Use   • Smoking status: Never Smoker   • Smokeless tobacco: Never Used   Substance Use Topics   • Alcohol  use: No   • Drug use: No          Objective   Physical Exam  Vitals:    03/03/22 1246   BP: 108/60   Pulse: 71   SpO2: 97%   Weight: 69.4 kg (153 lb 1.6 oz)     Body mass index is 24.71 kg/m².    Constitutional: NAD.  Eyes: EOMI. PERRLA. Normal conjunctiva.  Ear, nose, mouth, throat: No tonsillar exudates or erythema.   Normal nasal mucosa. Normal external ear canals and TMs bilaterally.  Cardiovascular: RRR. No murmurs. No LE edema b/l. Radial pulses 2+ bilaterally.  Pulmonary: CTA b/l. Good effort.  Integumentary: No rashes or wounds on face or upper extremities.  Lymphatic: No anterior cervical lymphadenopathy.  Endocrine: No thyromegaly or palpable thyroid nodules.  Psychiatric: Normal affect. Normal thought content.  Gastrointestinal: Nondistended. No hepatosplenomegaly.  Mild epigastric discomfort to deep palpation. Normal bowel sounds.       Assessment/Plan   Assessment and Plan  Very pleasant 62-year-old female with PTSD and recurrent major depressive disorder, hyperlipidemia, low libido, chronic lower back pain, GERD, who presents with the following    Diagnoses and all orders for this visit:    1. Annual physical exam (Primary): We discussed preventative care including age and patient-appropriate immunizations and cancer screening. We also discussed the importance of exercise and a healthy diet. This is their annual preventative exam.    2. Gastroesophageal reflux disease without esophagitis: We will have her on a PPI for 2 to 3 months and then will have her taper off once symptoms have resolved.  She does have a recent EGD from 2014 which was reportedly normal.  If not improving RTO.  -     pantoprazole (PROTONIX) 40 MG EC tablet; Take 1 tablet daily, and after 2-3 months if symptoms have improved wean off slowly  Dispense: 90 tablet; Refill: 1    3. Recurrent major depressive disorder, in full remission (HCC): Continue cautious and slow patient led taper  -     Zoloft 100 MG tablet; Take 75mg once daily   Dispense: 90 tablet; Refill: 3  4. PTSD (post-traumatic stress disorder)  -     Zoloft 100 MG tablet; Take 75mg once daily  Dispense: 90 tablet; Refill: 3    5. Hyperlipidemia, unspecified hyperlipidemia type: Risk is low but she did have coronary calcium noted on CT for her lung nodules.  She does have a family history of cardiac disease.  Depending on her lipids we may consider coronary artery calcium scan and I did discuss this with the patient.    6. Chronic low back pain, unspecified back pain laterality, unspecified whether sciatica present: Improved    7. Osteopenia, unspecified location: Followed by gynecology    8. Screening for blood or protein in urine  -     Urinalysis With Microscopic - Urine, Clean Catch    Return to office in 1 year for annual physical or earlier as needed.    Louis Guerrero MD  Family Medicine  O: [phone]  C: [phone]    Disclaimer: Parts of this note were dictated by speech recognition. Minor errors in transcription may be present. Please call if questions.

## 2022-03-04 LAB
APPEARANCE UR: CLEAR
BACTERIA #/AREA URNS HPF: NORMAL /[HPF]
BILIRUB UR QL STRIP: NEGATIVE
CASTS URNS QL MICRO: NORMAL /LPF
COLOR UR: YELLOW
EPI CELLS #/AREA URNS HPF: NORMAL /HPF (ref 0–10)
GLUCOSE UR QL STRIP: NEGATIVE
HGB UR QL STRIP: NEGATIVE
KETONES UR QL STRIP: NEGATIVE
LEUKOCYTE ESTERASE UR QL STRIP: NEGATIVE
MICRO URNS: NORMAL
MICRO URNS: NORMAL
NITRITE UR QL STRIP: NEGATIVE
PH UR STRIP: 6.5 [PH] (ref 5–7.5)
PROT UR QL STRIP: NEGATIVE
RBC #/AREA URNS HPF: NORMAL /HPF (ref 0–2)
SP GR UR STRIP: 1.01 (ref 1–1.03)
UROBILINOGEN UR STRIP-MCNC: 0.2 MG/DL (ref 0.2–1)
WBC #/AREA URNS HPF: NORMAL /HPF (ref 0–5)

## 2022-03-07 ENCOUNTER — TELEPHONE (OUTPATIENT)
Dept: INTERNAL MEDICINE | Facility: CLINIC | Age: 62
End: 2022-03-07

## 2022-03-07 NOTE — TELEPHONE ENCOUNTER
PHARMACY IS CALLING ASKING FOR A CALL BACK.  THEY ARE NEEDED CLARIFICATION ON THE MEDICATION Zoloft 100 MG tablet.  THE DIRECTIONS STATE TAKE 75 MG DAILY.  UNABLE TO BREAK THAT INTO FOURTHS.  PLEASE ADVISE     [phone]

## 2022-03-08 ENCOUNTER — TELEPHONE (OUTPATIENT)
Dept: INTERNAL MEDICINE | Facility: CLINIC | Age: 62
End: 2022-03-08

## 2022-03-08 NOTE — TELEPHONE ENCOUNTER
Caller: Tracee Knutson    Relationship: Self    Best call back number: [phone]    What test was performed: LABS     When was the test performed: 03/03    Where was the test performed: OFFICE    Additional notes: WOULD LIKE A CALL BACK ABOUT RESULTS.

## 2022-03-09 ENCOUNTER — TELEPHONE (OUTPATIENT)
Dept: INTERNAL MEDICINE | Facility: CLINIC | Age: 62
End: 2022-03-09

## 2022-03-09 DIAGNOSIS — E78.5 HYPERLIPIDEMIA, UNSPECIFIED HYPERLIPIDEMIA TYPE: ICD-10-CM

## 2022-03-09 DIAGNOSIS — Z82.49 FAMILY HISTORY OF HEART DISEASE: Primary | ICD-10-CM

## 2022-03-09 DIAGNOSIS — Z13.6 ENCOUNTER FOR SPECIAL SCREENING EXAMINATION FOR CARDIOVASCULAR DISORDER: ICD-10-CM

## 2022-03-09 NOTE — TELEPHONE ENCOUNTER
She must be referring to the coronary artery calcium scan which we did discuss.  I will place an appointment for this and they should call to schedule.

## 2022-03-09 NOTE — TELEPHONE ENCOUNTER
Patient called in requesting to schedule a coronary artery test? I don't see anything in your note cardiac related, she stated you all discussed this at her visit on 3/3/22.        Please advise,     Thanks!

## 2022-03-14 NOTE — TELEPHONE ENCOUNTER
S/w pharmacy and were were able to convert Rx to Zoloft 25mg tablet TID, as Zoloft only comes in 25, 50, 100 mg tablets.

## 2022-04-12 ENCOUNTER — HOSPITAL ENCOUNTER (OUTPATIENT)
Dept: CT IMAGING | Facility: HOSPITAL | Age: 62
Discharge: HOME | End: 2022-04-12
Admitting: FAMILY MEDICINE

## 2022-04-12 DIAGNOSIS — Z13.6 ENCOUNTER FOR SPECIAL SCREENING EXAMINATION FOR CARDIOVASCULAR DISORDER: ICD-10-CM

## 2022-04-12 DIAGNOSIS — E78.5 HYPERLIPIDEMIA, UNSPECIFIED HYPERLIPIDEMIA TYPE: ICD-10-CM

## 2022-04-12 DIAGNOSIS — Z82.49 FAMILY HISTORY OF HEART DISEASE: ICD-10-CM

## 2022-04-12 PROCEDURE — 75571 CT HRT W/O DYE W/CA TEST: CPT

## 2022-04-13 NOTE — PROGRESS NOTES
Your calcium scan did show a mild atherosclerotic burden in one of the main arteries of the heart. I think this in context with your family history, does increase your risk of heart disease. I ask that you consider a low dose of a statin medication to reduce your risk. Sometimes they cause side effects like muscle aches and fatigue, but usually if we start with a low dose we can find a brand of statin that works well for you and does not cause these problems.     If you are interested in starting this medication for your heart please let me know by MyChart or phone and I will call it in to the pharmacy. If you have any questions please don't hesitate to contact me here.

## 2022-05-15 DIAGNOSIS — F43.10 PTSD (POST-TRAUMATIC STRESS DISORDER): ICD-10-CM

## 2022-05-15 DIAGNOSIS — F33.42 RECURRENT MAJOR DEPRESSIVE DISORDER, IN FULL REMISSION: ICD-10-CM

## 2022-05-16 RX ORDER — SERTRALINE HCL 100 MG
TABLET ORAL
Qty: 90 TABLET | Refills: 0 | Status: SHIPPED | OUTPATIENT
Start: 2022-05-16 | End: 2022-08-01

## 2022-07-31 DIAGNOSIS — F33.42 RECURRENT MAJOR DEPRESSIVE DISORDER, IN FULL REMISSION: ICD-10-CM

## 2022-07-31 DIAGNOSIS — F43.10 PTSD (POST-TRAUMATIC STRESS DISORDER): ICD-10-CM

## 2022-08-01 RX ORDER — SERTRALINE HCL 100 MG
TABLET ORAL
Qty: 90 TABLET | Refills: 0 | Status: SHIPPED | OUTPATIENT
Start: 2022-08-01 | End: 2023-01-11

## 2022-10-25 ENCOUNTER — APPOINTMENT (OUTPATIENT)
Dept: WOMENS IMAGING | Facility: HOSPITAL | Age: 62
End: 2022-10-25

## 2022-10-25 PROCEDURE — 77063 BREAST TOMOSYNTHESIS BI: CPT | Performed by: RADIOLOGY

## 2022-10-25 PROCEDURE — 77067 SCR MAMMO BI INCL CAD: CPT | Performed by: RADIOLOGY

## 2023-01-11 DIAGNOSIS — F33.42 RECURRENT MAJOR DEPRESSIVE DISORDER, IN FULL REMISSION: ICD-10-CM

## 2023-01-11 DIAGNOSIS — F43.10 PTSD (POST-TRAUMATIC STRESS DISORDER): ICD-10-CM

## 2023-01-11 RX ORDER — SERTRALINE HCL 100 MG
TABLET ORAL
Qty: 90 TABLET | Refills: 0 | Status: SHIPPED | OUTPATIENT
Start: 2023-01-11 | End: 2023-03-13 | Stop reason: SDUPTHER

## 2023-03-13 ENCOUNTER — OFFICE VISIT (OUTPATIENT)
Dept: INTERNAL MEDICINE | Facility: CLINIC | Age: 63
End: 2023-03-13
Payer: COMMERCIAL

## 2023-03-13 VITALS
DIASTOLIC BLOOD PRESSURE: 84 MMHG | TEMPERATURE: 97.3 F | OXYGEN SATURATION: 98 % | BODY MASS INDEX: 24.73 KG/M2 | SYSTOLIC BLOOD PRESSURE: 112 MMHG | WEIGHT: 153.2 LBS | HEART RATE: 64 BPM

## 2023-03-13 DIAGNOSIS — E78.5 HYPERLIPIDEMIA, UNSPECIFIED HYPERLIPIDEMIA TYPE: ICD-10-CM

## 2023-03-13 DIAGNOSIS — R53.83 FATIGUE, UNSPECIFIED TYPE: ICD-10-CM

## 2023-03-13 DIAGNOSIS — Z00.00 ANNUAL PHYSICAL EXAM: Primary | ICD-10-CM

## 2023-03-13 DIAGNOSIS — Z78.0 POSTMENOPAUSAL: ICD-10-CM

## 2023-03-13 DIAGNOSIS — R73.09 ELEVATED GLUCOSE: ICD-10-CM

## 2023-03-13 DIAGNOSIS — M85.80 OSTEOPENIA, UNSPECIFIED LOCATION: ICD-10-CM

## 2023-03-13 DIAGNOSIS — K21.9 GASTROESOPHAGEAL REFLUX DISEASE WITHOUT ESOPHAGITIS: ICD-10-CM

## 2023-03-13 DIAGNOSIS — F43.10 PTSD (POST-TRAUMATIC STRESS DISORDER): ICD-10-CM

## 2023-03-13 DIAGNOSIS — M51.37 DEGENERATION OF LUMBAR OR LUMBOSACRAL INTERVERTEBRAL DISC: ICD-10-CM

## 2023-03-13 DIAGNOSIS — F33.42 RECURRENT MAJOR DEPRESSIVE DISORDER, IN FULL REMISSION: ICD-10-CM

## 2023-03-13 PROBLEM — G89.29 CHRONIC RIGHT FLANK PAIN: Status: RESOLVED | Noted: 2021-01-05 | Resolved: 2023-03-13

## 2023-03-13 PROBLEM — H81.13 BENIGN PAROXYSMAL POSITIONAL VERTIGO DUE TO BILATERAL VESTIBULAR DISORDER: Status: RESOLVED | Noted: 2018-12-11 | Resolved: 2023-03-13

## 2023-03-13 PROBLEM — R91.1 INCIDENTAL LUNG NODULE, > 3MM AND < 8MM: Status: RESOLVED | Noted: 2021-01-14 | Resolved: 2023-03-13

## 2023-03-13 PROBLEM — R10.9 CHRONIC RIGHT FLANK PAIN: Status: RESOLVED | Noted: 2021-01-05 | Resolved: 2023-03-13

## 2023-03-13 PROCEDURE — 99396 PREV VISIT EST AGE 40-64: CPT | Performed by: FAMILY MEDICINE

## 2023-03-13 RX ORDER — PANTOPRAZOLE SODIUM 40 MG/1
TABLET, DELAYED RELEASE ORAL
Qty: 90 TABLET | Refills: 1 | Status: SHIPPED | OUTPATIENT
Start: 2023-03-13

## 2023-03-13 RX ORDER — SERTRALINE HCL 100 MG
TABLET ORAL
Qty: 90 TABLET | Refills: 3 | Status: SHIPPED | OUTPATIENT
Start: 2023-03-13

## 2023-03-13 NOTE — PROGRESS NOTES
Date of Encounter: 2023  Patient: Tracee Knutson,  1960    Subjective   History of Presenting Illness  Chief complaint: Annual physical     No acute concerns.    GERD, generally well controlled with diet and avoid spicy foods.  She had a few episodes last week but otherwise is not having heartburn regularly.  Uses Tums as needed.  No dysphagia.    Recurrent depressive disorder with PTSD, history of survivor from the [place] war.  She is continues to do well on brand-name Zoloft.  Every time she has tried to wean off of this she has become suicidal.  Currently she describes her mood is more irritable recently, does not want to make any changes yet as she usually does better in the spring.    Nonspecific fatigue, went to the ER in December for this without any significant findings.  She is tolerating exercise well.    Lumbar degenerative disc disease with no current symptoms    Hyperlipidemia with mildly elevated coronary artery calcium scan, would like to wait to see this year's lipids before considering medication.    Osteopenia not taking any vitamin D supplementation.  This was being obtained through her gynecologist but she has not seen them this year.    History of nephrolithiasis with no recurrence     Lives with .  Not currently sexually active.  Never smoker, minimal alcohol use.  Doing yoga once per week.  Overall very healthy diet. Mammogram , EGD and colonoscopy  with 10-year follow-up, last Pap smear .  DEXA scan .  Works as a make-up artist.  Recommend Shingrix, COVID bivalent, tetanus vaccination at her leisure which she declines today.    Review of Systems:  Negative for fever, congestion, chest pain upon exertion, shortness of breath, vision changes, vomiting, dysuria, lymphadenopathy, muscle weakness, numbness, rashes.    Positive for mood change    The following portions of the patient's history were reviewed and updated as appropriate: allergies, current  medications, past family history, past medical history, past social history, past surgical history and problem list.    Patient Active Problem List   Diagnosis   • Degeneration of lumbar or lumbosacral intervertebral disc   • Depression   • Chronic low back pain   • Libido, decreased, due to medication   • Hyperlipidemia   • Osteopenia   • Renal cyst   • Gastroesophageal reflux disease without esophagitis     Past Medical History:   Diagnosis Date   • Benign paroxysmal positional vertigo due to bilateral vestibular disorder 12/11/2018   • Cervicalgia 11/9/2017   • Chronic right flank pain 1/5/2021   • Depression    • Depression    • Incidental lung nodule, > 3mm and < 8mm 1/14/2021   • TIA (transient ischemic attack)      Past Surgical History:   Procedure Laterality Date   • MIDDLE EAR SURGERY       Family History   Problem Relation Age of Onset   • Depression Mother    • Heart disease Mother    • Heart disease Paternal Grandfather    • Heart disease Paternal Grandmother    • Heart disease Maternal Grandmother    • Heart disease Maternal Grandfather        Current Outpatient Medications:   •  pantoprazole (PROTONIX) 40 MG EC tablet, Take 1 tablet daily, and after 2-3 months if symptoms have improved wean off slowly, Disp: 90 tablet, Rfl: 1  •  Zoloft 100 MG tablet, Take 1 tablet by mouth daily, Disp: 90 tablet, Rfl: 3  •  melatonin 1 MG tablet, Take  by mouth., Disp: , Rfl:   Allergies   Allergen Reactions   • Penicillins      Social History     Tobacco Use   • Smoking status: Never   • Smokeless tobacco: Never   Substance Use Topics   • Alcohol use: No   • Drug use: No          Objective   Physical Exam  Vitals:    03/13/23 0908   BP: 112/84   Pulse: 64   Temp: 97.3 °F (36.3 °C)   TempSrc: Infrared   SpO2: 98%   Weight: 69.5 kg (153 lb 3.2 oz)     Body mass index is 24.73 kg/m².    Constitutional: NAD.  Eyes: EOMI. PERRLA. Normal conjunctiva.  Ear, nose, mouth, throat: No tonsillar exudates or erythema.   Normal  nasal mucosa. Normal external ear canals and TMs bilaterally.  Cardiovascular: RRR. No murmurs. No LE edema b/l. Radial pulses 2+ bilaterally.  Pulmonary: CTA b/l. Good effort.  Integumentary: No rashes or wounds on face or upper extremities.  Lymphatic: No anterior cervical lymphadenopathy.  Endocrine: No thyromegaly or palpable thyroid nodules.  Psychiatric: Normal affect. Normal thought content.  Gastrointestinal: Nondistended. No hepatosplenomegaly. No focal tenderness to palpation. Normal bowel sounds.       Assessment & Plan   Assessment and Plan  Very pleasant 63-year-old female with PTSD and recurrent major depressive disorder, hyperlipidemia, chronic lower back pain,  osteopenia, GERD, who presents with the following    Diagnoses and all orders for this visit:    1. Annual physical exam (Primary): We discussed preventative care including age and patient-appropriate immunizations and cancer screening. We also discussed the importance of exercise and a healthy diet. This is their annual preventative exam.    2. Gastroesophageal reflux disease without esophagitis: Controlled  -     pantoprazole (PROTONIX) 40 MG EC tablet; Take 1 tablet daily, and after 2-3 months if symptoms have improved wean off slowly  Dispense: 90 tablet; Refill: 1    3. Recurrent major depressive disorder, in full remission (HCC): Continue to follow closely, if worsening would increase Zoloft or add on bupropion  -     Zoloft 100 MG tablet; Take 1 tablet by mouth daily  Dispense: 90 tablet; Refill: 3  4. PTSD (post-traumatic stress disorder)  -     Zoloft 100 MG tablet; Take 1 tablet by mouth daily  Dispense: 90 tablet; Refill: 3    5. Fatigue, unspecified type  -     Magnesium  -     Urinalysis With Microscopic - Urine, Clean Catch  -     Vitamin B12    6. Hyperlipidemia, unspecified hyperlipidemia type: Patient would like to recheck LDL before considering statin but I would consider her to qualify based upon her coronary artery calcium  scan and her previous lipids.  Reviewed the risks and benefits of these medications recently, will await blood work.  -     CBC & Differential  -     Comprehensive Metabolic Panel  -     Lipid Panel  -     Thyroid Panel With TSH    7. Degeneration of lumbar or lumbosacral intervertebral disc: Asymptomatic    8. Osteopenia, unspecified location  -     Vitamin D,25-Hydroxy    9. Postmenopausal  -     DEXA Bone Density Axial    10. Elevated glucose  -     Hemoglobin A1c    Return to office in 1 year for annual physical or earlier as needed.    Louis Guerrero MD  Family Medicine  O: [phone]    Disclaimer: Parts of this note were dictated by speech recognition. Minor errors in transcription may be present. Please call if questions.

## 2023-03-14 LAB
25(OH)D3+25(OH)D2 SERPL-MCNC: 29.9 NG/ML (ref 30–100)
ALBUMIN SERPL-MCNC: 4.3 G/DL (ref 3.8–4.8)
ALBUMIN/GLOB SERPL: 1.5 {RATIO} (ref 1.2–2.2)
ALP SERPL-CCNC: 77 IU/L (ref 44–121)
ALT SERPL-CCNC: 16 IU/L (ref 0–32)
APPEARANCE UR: CLEAR
AST SERPL-CCNC: 18 IU/L (ref 0–40)
BACTERIA #/AREA URNS HPF: NORMAL /[HPF]
BASOPHILS # BLD AUTO: 0 X10E3/UL (ref 0–0.2)
BASOPHILS NFR BLD AUTO: 1 %
BILIRUB SERPL-MCNC: 0.3 MG/DL (ref 0–1.2)
BILIRUB UR QL STRIP: NEGATIVE
BUN SERPL-MCNC: 15 MG/DL (ref 8–27)
BUN/CREAT SERPL: 21 (ref 12–28)
CALCIUM SERPL-MCNC: 9.4 MG/DL (ref 8.7–10.3)
CASTS URNS QL MICRO: NORMAL /LPF
CHLORIDE SERPL-SCNC: 106 MMOL/L (ref 96–106)
CHOLEST SERPL-MCNC: 247 MG/DL (ref 100–199)
CO2 SERPL-SCNC: 25 MMOL/L (ref 20–29)
COLOR UR: YELLOW
CREAT SERPL-MCNC: 0.73 MG/DL (ref 0.57–1)
EGFRCR SERPLBLD CKD-EPI 2021: 92 ML/MIN/1.73
EOSINOPHIL # BLD AUTO: 0.1 X10E3/UL (ref 0–0.4)
EOSINOPHIL NFR BLD AUTO: 2 %
EPI CELLS #/AREA URNS HPF: NORMAL /HPF (ref 0–10)
ERYTHROCYTE [DISTWIDTH] IN BLOOD BY AUTOMATED COUNT: 12.2 % (ref 11.7–15.4)
FT4I SERPL CALC-MCNC: 1.7 (ref 1.2–4.9)
GLOBULIN SER CALC-MCNC: 2.8 G/DL (ref 1.5–4.5)
GLUCOSE SERPL-MCNC: 86 MG/DL (ref 70–99)
GLUCOSE UR QL STRIP: NEGATIVE
HBA1C MFR BLD: 5.8 % (ref 4.8–5.6)
HCT VFR BLD AUTO: 41.2 % (ref 34–46.6)
HDLC SERPL-MCNC: 63 MG/DL
HGB BLD-MCNC: 14 G/DL (ref 11.1–15.9)
HGB UR QL STRIP: NEGATIVE
IMM GRANULOCYTES # BLD AUTO: 0 X10E3/UL (ref 0–0.1)
IMM GRANULOCYTES NFR BLD AUTO: 0 %
KETONES UR QL STRIP: NEGATIVE
LDLC SERPL CALC-MCNC: 165 MG/DL (ref 0–99)
LEUKOCYTE ESTERASE UR QL STRIP: NEGATIVE
LYMPHOCYTES # BLD AUTO: 2.4 X10E3/UL (ref 0.7–3.1)
LYMPHOCYTES NFR BLD AUTO: 44 %
MAGNESIUM SERPL-MCNC: 2.2 MG/DL (ref 1.6–2.3)
MCH RBC QN AUTO: 30.2 PG (ref 26.6–33)
MCHC RBC AUTO-ENTMCNC: 34 G/DL (ref 31.5–35.7)
MCV RBC AUTO: 89 FL (ref 79–97)
MICRO URNS: NORMAL
MICRO URNS: NORMAL
MONOCYTES # BLD AUTO: 0.4 X10E3/UL (ref 0.1–0.9)
MONOCYTES NFR BLD AUTO: 8 %
NEUTROPHILS # BLD AUTO: 2.5 X10E3/UL (ref 1.4–7)
NEUTROPHILS NFR BLD AUTO: 45 %
NITRITE UR QL STRIP: NEGATIVE
PH UR STRIP: 7 [PH] (ref 5–7.5)
PLATELET # BLD AUTO: 231 X10E3/UL (ref 150–450)
POTASSIUM SERPL-SCNC: 4.1 MMOL/L (ref 3.5–5.2)
PROT SERPL-MCNC: 7.1 G/DL (ref 6–8.5)
PROT UR QL STRIP: NEGATIVE
RBC # BLD AUTO: 4.64 X10E6/UL (ref 3.77–5.28)
RBC #/AREA URNS HPF: NORMAL /HPF (ref 0–2)
SODIUM SERPL-SCNC: 146 MMOL/L (ref 134–144)
SP GR UR STRIP: 1.02 (ref 1–1.03)
T3RU NFR SERPL: 26 % (ref 24–39)
T4 SERPL-MCNC: 6.6 UG/DL (ref 4.5–12)
TRIGL SERPL-MCNC: 110 MG/DL (ref 0–149)
TSH SERPL DL<=0.005 MIU/L-ACNC: 2.44 UIU/ML (ref 0.45–4.5)
UROBILINOGEN UR STRIP-MCNC: 0.2 MG/DL (ref 0.2–1)
VIT B12 SERPL-MCNC: 422 PG/ML (ref 232–1245)
VLDLC SERPL CALC-MCNC: 19 MG/DL (ref 5–40)
WBC # BLD AUTO: 5.4 X10E3/UL (ref 3.4–10.8)
WBC #/AREA URNS HPF: NORMAL /HPF (ref 0–5)

## 2023-03-15 DIAGNOSIS — E55.9 VITAMIN D DEFICIENCY: Primary | ICD-10-CM

## 2023-03-15 PROBLEM — R73.03 PREDIABETES: Status: ACTIVE | Noted: 2023-03-15

## 2023-03-15 RX ORDER — CHOLECALCIFEROL (VITAMIN D3) 50 MCG
TABLET ORAL
Qty: 90 EACH | Refills: 3 | Status: SHIPPED | OUTPATIENT
Start: 2023-03-15

## 2023-03-16 NOTE — PROGRESS NOTES
Cholesterol has worsened since we last checked it.  Based upon this, as well as your coronary artery calcium scan, I do recommend you consider a statin medication as we discussed.  If you would like to try low-dose of a statin please let me know and I will send it into your pharmacy.    Your blood sugar is mildly elevated and you are considered to have early prediabetes.  This can happen with age even if you eat well.  Please be mindful of sugar in your diet, we will watch this annually.    Your vitamin D is low, I will send in a vitamin D supplement for this.    There are some other minor variations but I am not concerned about them.

## 2023-03-20 ENCOUNTER — TELEPHONE (OUTPATIENT)
Dept: INTERNAL MEDICINE | Facility: CLINIC | Age: 63
End: 2023-03-20
Payer: COMMERCIAL

## 2023-03-20 NOTE — TELEPHONE ENCOUNTER
Caller: Tracee Knutson    Relationship: Self    Best call back number: [phone]*    What is the best time to reach you: ANYTIME FROM 11AM-9PM    Who are you requesting to speak with (clinical staff, provider,  specific staff member): DR. PAYAL BURGESS    What was the call regarding: PATIENT CALLING REQUESTING A CALL BACK FROM DR. BURGESS, TO DISCUSS STARTING CHOLESTEROL MEDICATION.    PHARMACY CONFIRMED:  [id number] - [place], [place] - [address].  [phone] [facility] [phone]   [phone]    Do you require a callback: YES

## 2023-03-20 NOTE — TELEPHONE ENCOUNTER
Spoke to pt about questions, pt wanted to speak to provider directly. Appt was scheduled. Pt has no further questions.

## 2023-03-30 ENCOUNTER — OFFICE VISIT (OUTPATIENT)
Dept: INTERNAL MEDICINE | Facility: CLINIC | Age: 63
End: 2023-03-30
Payer: COMMERCIAL

## 2023-03-30 VITALS
TEMPERATURE: 96.6 F | HEART RATE: 79 BPM | OXYGEN SATURATION: 96 % | WEIGHT: 153.2 LBS | BODY MASS INDEX: 24.73 KG/M2 | DIASTOLIC BLOOD PRESSURE: 60 MMHG | SYSTOLIC BLOOD PRESSURE: 112 MMHG

## 2023-03-30 DIAGNOSIS — E78.5 HYPERLIPIDEMIA, UNSPECIFIED HYPERLIPIDEMIA TYPE: Primary | ICD-10-CM

## 2023-03-30 PROCEDURE — 99213 OFFICE O/P EST LOW 20 MIN: CPT | Performed by: FAMILY MEDICINE

## 2023-03-30 RX ORDER — ATORVASTATIN CALCIUM 10 MG/1
TABLET, FILM COATED ORAL
Qty: 60 TABLET | Refills: 1 | Status: SHIPPED | OUTPATIENT
Start: 2023-03-30

## 2023-03-30 NOTE — PROGRESS NOTES
"  Date of Encounter: 2023  Patient: Tracee Knutson,  1960    Subjective   History of Presenting Illness  Chief complaint: Statin    Patient here to discuss initiating a statin.  She has persistently elevated LDL cholesterol with a calculated 10-year ASCVD of 4.1%.  Coronary artery calcium scan was 40 mostly located in the LAD. her brother has known coronary artery disease. patient does describe occasional chest aching \"when she is tired\", this is infrequent, and does not occur with exercise.  She is able to walk vigorously for exercise even uphill without any chest pain or shortness of breath.  With this chest discomfort she is not short of breath.    The following portions of the patient's history were reviewed and updated as appropriate: allergies, current medications, past family history, past medical history, past social history, past surgical history and problem list.    Patient Active Problem List   Diagnosis   • Degeneration of lumbar or lumbosacral intervertebral disc   • Depression   • Chronic low back pain   • Libido, decreased, due to medication   • Hyperlipidemia   • Osteopenia   • Renal cyst   • Gastroesophageal reflux disease without esophagitis   • Prediabetes   • Vitamin D deficiency     Past Medical History:   Diagnosis Date   • Benign paroxysmal positional vertigo due to bilateral vestibular disorder 2018   • Cervicalgia 2017   • Chronic right flank pain 2021   • Depression    • Depression    • Incidental lung nodule, > 3mm and < 8mm 2021   • TIA (transient ischemic attack)      Past Surgical History:   Procedure Laterality Date   • MIDDLE EAR SURGERY       Family History   Problem Relation Age of Onset   • Depression Mother    • Heart disease Mother    • Heart disease Paternal Grandfather    • Heart disease Paternal Grandmother    • Heart disease Maternal Grandmother    • Heart disease Maternal Grandfather        Current Outpatient Medications:   •  Cholecalciferol " "(D3) 50 MCG (2000 UT) tablet, Take 1 tab by mouth daily for vitamin D deficiency, Disp: 90 each, Rfl: 3  •  melatonin 1 MG tablet, Take  by mouth., Disp: , Rfl:   •  pantoprazole (PROTONIX) 40 MG EC tablet, Take 1 tablet daily, and after 2-3 months if symptoms have improved wean off slowly, Disp: 90 tablet, Rfl: 1  •  Zoloft 100 MG tablet, Take 1 tablet by mouth daily, Disp: 90 tablet, Rfl: 3  •  atorvastatin (LIPITOR) 10 MG tablet, Take 1 tab PO QD for cholesterol and heart health, Disp: 60 tablet, Rfl: 1  Allergies   Allergen Reactions   • Penicillins      Social History     Tobacco Use   • Smoking status: Never   • Smokeless tobacco: Never   Substance Use Topics   • Alcohol use: No   • Drug use: No          Objective   Physical Exam  Vitals:    03/30/23 1048   BP: 112/60   BP Location: Left arm   Patient Position: Sitting   Cuff Size: Adult   Pulse: 79   Temp: 96.6 °F (35.9 °C)   TempSrc: Infrared   SpO2: 96%   Weight: 69.5 kg (153 lb 3.2 oz)     Body mass index is 24.73 kg/m².    Constitutional: NAD.  Psychiatric: Normal affect. Normal thought content.     Assessment & Plan   Assessment and Plan  Very pleasant 63-year-old female with PTSD and recurrent major depressive disorder, hyperlipidemia, chronic lower back pain,  osteopenia, GERD, who presents with the following    Diagnoses and all orders for this visit:    1. Hyperlipidemia, unspecified hyperlipidemia type (Primary)  -     atorvastatin (LIPITOR) 10 MG tablet; Take 1 tab PO QD for cholesterol and heart health  Dispense: 60 tablet; Refill: 1    I think her risk remains borderline with mildly elevated Agatston score and low-medium 10-year ASCVD.  Brother's coronary artery disease does add to this risk.  I think a trial of a statin is reasonable, I discussed the risks and benefits of these medications.  We will start with atorvastatin 10 mg and if tolerated can titrate as needed.  Discussed the role of coenzyme Q 10 if needed.  Discussed the \"statin " "roulette\" in case she does not tolerate this medicine.  I suspect her chest discomfort I would like her to monitor it, if becoming exertional, increasing in frequency, or associated with shortness of breath we need to consider cardiovascular stress testing, she could tolerate a treadmill.  She would like to wait on this for now.    Louis Guerrero MD  Family Medicine  O: [phone]    Disclaimer: Parts of this note were dictated by speech recognition. Minor errors in transcription may be present. Please call if questions.     "

## 2023-05-05 ENCOUNTER — OFFICE VISIT (OUTPATIENT)
Dept: INTERNAL MEDICINE | Facility: CLINIC | Age: 63
End: 2023-05-05
Payer: COMMERCIAL

## 2023-05-05 VITALS
HEART RATE: 77 BPM | DIASTOLIC BLOOD PRESSURE: 68 MMHG | OXYGEN SATURATION: 95 % | WEIGHT: 158.51 LBS | HEIGHT: 66 IN | SYSTOLIC BLOOD PRESSURE: 112 MMHG | BODY MASS INDEX: 25.47 KG/M2 | TEMPERATURE: 100 F

## 2023-05-05 DIAGNOSIS — J02.9 ACUTE PHARYNGITIS, UNSPECIFIED ETIOLOGY: Primary | ICD-10-CM

## 2023-05-05 PROCEDURE — 99213 OFFICE O/P EST LOW 20 MIN: CPT | Performed by: NURSE PRACTITIONER

## 2023-05-05 RX ORDER — FLUTICASONE PROPIONATE 50 MCG
2 SPRAY, SUSPENSION (ML) NASAL DAILY
COMMUNITY
Start: 2023-04-29

## 2023-05-05 RX ORDER — LEVOCETIRIZINE DIHYDROCHLORIDE 5 MG/1
TABLET, FILM COATED ORAL
COMMUNITY
Start: 2023-04-29

## 2023-05-05 RX ORDER — CETIRIZINE HYDROCHLORIDE 5 MG/1
10 TABLET ORAL DAILY
COMMUNITY

## 2023-05-05 RX ORDER — BENZONATATE 100 MG/1
100 CAPSULE ORAL 3 TIMES DAILY PRN
Qty: 30 CAPSULE | Refills: 0 | Status: SHIPPED | OUTPATIENT
Start: 2023-05-05

## 2023-05-05 RX ORDER — AZITHROMYCIN 250 MG/1
TABLET, FILM COATED ORAL
COMMUNITY
Start: 2023-05-03

## 2023-05-05 NOTE — PROGRESS NOTES
Chief Complaint  Cough, URI, Nasal Congestion, and Generalized Body Aches    Subjective        Tracee Knutson presents to [facility] PRIMARY CARE  History of Present Illness  Here with c/o of dry, painful cough, brownish, thick mucus out of nose, some face pain relieved with Mucinex.     She was seen at [facility] on April 29, 2023 for 3-day history of sore throat with little relief from taking daily Zyrtec.   Diagnosed with acute pharyngitis with unspecified etiology, allergic rhinitis, and arthralgia, unspecified joint.  She was advised to take Zyrtec 5 mg daily, to rest drink plenty fluids, and gargle with warm salt water as well as drink tea with honey and lemon.     She then presented to urgent care at [facility] on [facility] on May 5, 2023 with same symptoms.  She declines strep testing her said that she had a recent strep test that was negative.  He was diagnosed with pansinusitis and prescribed a Z-Hector, however the patient never started the Z-Hector.    Immediate care visit on May 5, 2023 she tested negative for COVID and influenza a and B. In addition to the Zyrtec, Flonase, Mucinex, she is also taking over-the-counter Robitussin, which is only providing very minimal relief to her cough.  Cough  This is a recurrent problem. The current episode started in the past 7 days. The problem has been unchanged. The problem occurs constantly. The cough is non-productive. Associated symptoms include nasal congestion, postnasal drip, rhinorrhea and a sore throat. Pertinent negatives include no chest pain, chills, ear congestion, ear pain, fever, headaches, heartburn, hemoptysis, myalgias, rash, shortness of breath, sweats, weight loss or wheezing. The symptoms are aggravated by pollens. The treatment provided mild relief.       Objective   Vital Signs:  /68 (BP Location: Left arm, Patient Position: Sitting, Cuff Size: Adult)   Pulse 77   Temp 100 °F (37.8 °C)  "(Infrared)   Ht 167.6 cm (65.98\")   Wt 71.9 kg (158 lb 8.2 oz)   SpO2 95%   BMI 25.60 kg/m²   Estimated body mass index is 25.6 kg/m² as calculated from the following:    Height as of this encounter: 167.6 cm (65.98\").    Weight as of this encounter: 71.9 kg (158 lb 8.2 oz).             Physical Exam  Vitals and nursing note reviewed.   Constitutional:       Appearance: Normal appearance. She is normal weight.   HENT:      Head: Normocephalic and atraumatic.      Right Ear: Hearing normal. No drainage, swelling or tenderness. No middle ear effusion. There is no impacted cerumen. No foreign body. Tympanic membrane is not scarred, perforated, erythematous or retracted.      Left Ear: Hearing normal. No drainage, swelling or tenderness.  No middle ear effusion. There is no impacted cerumen. No foreign body. Tympanic membrane is not scarred, perforated, erythematous or retracted.      Ears:        Comments: Right TM mildly cloudy.      Nose: Nose normal. No nasal deformity, septal deviation, signs of injury, nasal tenderness, mucosal edema, congestion or rhinorrhea.      Right Nostril: No foreign body, epistaxis, septal hematoma or occlusion.      Left Nostril: No foreign body, epistaxis, septal hematoma or occlusion.      Right Turbinates: Not enlarged, swollen or pale.      Left Turbinates: Not enlarged, swollen or pale.      Right Sinus: No maxillary sinus tenderness or frontal sinus tenderness.      Left Sinus: No maxillary sinus tenderness or frontal sinus tenderness.      Mouth/Throat:      Lips: Pink. No lesions.      Mouth: Mucous membranes are moist. No injury, lacerations, oral lesions or angioedema.      Dentition: Normal dentition.      Tongue: No lesions. Tongue does not deviate from midline.      Palate: No mass and lesions.      Pharynx: Uvula midline. Oropharyngeal exudate and posterior oropharyngeal erythema present. No pharyngeal swelling or uvula swelling.      Tonsils: No tonsillar exudate or " tonsillar abscesses. 0 on the right. 0 on the left.   Eyes:      General: Lids are normal.      Conjunctiva/sclera: Conjunctivae normal.   Cardiovascular:      Rate and Rhythm: Normal rate and regular rhythm.   Pulmonary:      Effort: Pulmonary effort is normal.      Breath sounds: Normal breath sounds and air entry. No stridor, decreased air movement or transmitted upper airway sounds. No decreased breath sounds, wheezing, rhonchi or rales.   Neurological:      Mental Status: She is alert.        Result Review :      Data reviewed:  Office notes from 04/29/2023 and 05/03/2023.             Assessment and Plan   Patient is a very pleasant 63-year-old female with history of lipidemia, prediabetes, GERD, low back pain, degenerative disease of the lumbar or lumbosacral intervertebral disc here with upper respiratory infection.     Diagnoses and all orders for this visit:    1. Acute pharyngitis, unspecified etiology (Primary)  -     benzonatate (Tessalon Perles) 100 MG capsule; Take 1 capsule by mouth 3 (Three) Times a Day As Needed for Cough.  Dispense: 30 capsule; Refill: 0    Take Z-catracho prescribed to you by outside provider. Take Tessalon perles for cough. If you develop shortness of air, wheezing, difficulty breathing, call 911 and go to the ED.          Follow Up   Return if symptoms worsen or fail to improve.  Patient was given instructions and counseling regarding her condition or for health maintenance advice. Please see specific information pulled into the AVS if appropriate.         Answers for HPI/ROS submitted by the patient on 5/5/2023  What is the primary reason for your visit?: Cough

## 2023-07-25 ENCOUNTER — OFFICE VISIT (OUTPATIENT)
Dept: INTERNAL MEDICINE | Facility: CLINIC | Age: 63
End: 2023-07-25
Payer: COMMERCIAL

## 2023-07-25 VITALS
OXYGEN SATURATION: 96 % | HEIGHT: 66 IN | SYSTOLIC BLOOD PRESSURE: 118 MMHG | TEMPERATURE: 97.3 F | WEIGHT: 158.4 LBS | HEART RATE: 76 BPM | DIASTOLIC BLOOD PRESSURE: 78 MMHG | BODY MASS INDEX: 25.46 KG/M2

## 2023-07-25 DIAGNOSIS — R73.03 PREDIABETES: ICD-10-CM

## 2023-07-25 DIAGNOSIS — E78.5 HYPERLIPIDEMIA, UNSPECIFIED HYPERLIPIDEMIA TYPE: ICD-10-CM

## 2023-07-25 DIAGNOSIS — M54.12 LEFT CERVICAL RADICULOPATHY: Primary | ICD-10-CM

## 2023-07-25 PROCEDURE — 99214 OFFICE O/P EST MOD 30 MIN: CPT | Performed by: FAMILY MEDICINE

## 2023-07-25 RX ORDER — PREDNISONE 10 MG/1
TABLET ORAL
Qty: 28 TABLET | Refills: 0 | Status: SHIPPED | OUTPATIENT
Start: 2023-07-25 | End: 2023-08-05

## 2023-07-25 NOTE — PROGRESS NOTES
Date of Encounter: 2023  Patient: Tracee Knutson,  1960    Subjective   History of Presenting Illness  Chief complaint: Upper back pain with left hand numbness    1 month history of pain in her upper back and left shoulder radiating to her second through third digits of her left hand.  She has found this worsening with certain neck positions.  No weakness in the hand or arm.  No problems with her  strength or opening jars.  She has been taking Aleve with modest improvement in her symptoms.  This has not limited her activities significantly.  No symptoms on the other side.  No known injury.  She does do yoga regularly.    Tolerating statin, interested in lipid recheck in about 3 months    The following portions of the patient's history were reviewed and updated as appropriate: allergies, current medications, past family history, past medical history, past social history, past surgical history and problem list.    Patient Active Problem List   Diagnosis    Degeneration of lumbar or lumbosacral intervertebral disc    Depression    Chronic low back pain    Libido, decreased, due to medication    Hyperlipidemia    Osteopenia    Renal cyst    Gastroesophageal reflux disease without esophagitis    Prediabetes    Vitamin D deficiency     Past Medical History:   Diagnosis Date    Benign paroxysmal positional vertigo due to bilateral vestibular disorder 2018    Cervicalgia 2017    Chronic right flank pain 2021    Depression     Depression     Incidental lung nodule, > 3mm and < 8mm 2021    TIA (transient ischemic attack)      Past Surgical History:   Procedure Laterality Date    MIDDLE EAR SURGERY       Family History   Problem Relation Age of Onset    Depression Mother     Heart disease Mother     Heart disease Paternal Grandfather     Heart disease Paternal Grandmother     Heart disease Maternal Grandmother     Heart disease Maternal Grandfather        Current Outpatient Medications:      "atorvastatin (LIPITOR) 10 MG tablet, Take 1 tab PO QD for cholesterol and heart health, Disp: 60 tablet, Rfl: 1    Cholecalciferol (D3) 50 MCG (2000 UT) tablet, Take 1 tab by mouth daily for vitamin D deficiency, Disp: 90 each, Rfl: 3    melatonin 1 MG tablet, Take  by mouth., Disp: , Rfl:     pantoprazole (PROTONIX) 40 MG EC tablet, Take 1 tablet daily, and after 2-3 months if symptoms have improved wean off slowly, Disp: 90 tablet, Rfl: 1    Zoloft 100 MG tablet, Take 1 tablet by mouth daily, Disp: 90 tablet, Rfl: 3    predniSONE (DELTASONE) 10 MG tablet, Take 4 tablets by mouth Daily for 4 days, THEN 2 tablets Daily for 4 days, THEN 1 tablet Daily for 4 days., Disp: 28 tablet, Rfl: 0  Allergies   Allergen Reactions    Penicillins      Social History     Tobacco Use    Smoking status: Never    Smokeless tobacco: Never   Substance Use Topics    Alcohol use: No    Drug use: No          Objective   Physical Exam  Vitals:    07/25/23 1409   BP: 118/78   BP Location: Left arm   Patient Position: Sitting   Cuff Size: Adult   Pulse: 76   Temp: 97.3 °F (36.3 °C)   TempSrc: Infrared   SpO2: 96%   Weight: 71.8 kg (158 lb 6.4 oz)   Height: 167.6 cm (65.98\")     Body mass index is 25.58 kg/m².    Constitutional: NAD.  Psychiatric: Normal affect. Normal thought content..  Musculoskeletal: There is no tenderness to palpation of the cervical vertebra.  There is tenderness to palpation of the left paraspinal muscles and left rhomboid muscles.  Normal range of motion and strength in the neck muscles  Neurologic: Positive Spurling test to the left side.   strength intact in the left hand.  Negative tinnel test at the wrist, Phalen test.     Assessment & Plan   Assessment and Plan  Very pleasant 63-year-old female with PTSD and recurrent major depressive disorder, hyperlipidemia, chronic lower back pain,  osteopenia, GERD, who presents with the following     Diagnoses and all orders for this visit:    1. Left cervical " radiculopathy (Primary): Discussed etiology, prognosis and management of cervical radiculopathy.  No red flags.  Recommend course of prednisone plus home exercises versus physical therapy.  She would like to try home exercises first.  If not improving recommend physical therapy and consideration of additional imaging  -     predniSONE (DELTASONE) 10 MG tablet; Take 4 tablets by mouth Daily for 4 days, THEN 2 tablets Daily for 4 days, THEN 1 tablet Daily for 4 days.  Dispense: 28 tablet; Refill: 0    2. Hyperlipidemia, unspecified hyperlipidemia type: Tolerating statin well for about 3 months, will get her lipids in about 3 more months for titration  -     Lipid Panel; Future  -     Comprehensive Metabolic Panel; Future    3. Prediabetes  -     Hemoglobin A1c; Future    Louis Guerrero MD  Family Medicine  O: [phone]    Disclaimer: Parts of this note were dictated by speech recognition. Minor errors in transcription may be present. Please call if questions.

## 2023-08-08 ENCOUNTER — TELEPHONE (OUTPATIENT)
Dept: INTERNAL MEDICINE | Facility: CLINIC | Age: 63
End: 2023-08-08
Payer: COMMERCIAL

## 2023-08-08 DIAGNOSIS — M54.12 LEFT CERVICAL RADICULOPATHY: Primary | ICD-10-CM

## 2023-08-08 NOTE — TELEPHONE ENCOUNTER
Pt calling today about tingling in fingers in left hand.    Pt states that this is ongoing, and has not gotten any better with her home exercises and completion of steroids.    Last OV suggested addtl. imaging/ phys therapy.      Please advise.

## 2023-08-08 NOTE — TELEPHONE ENCOUNTER
Recommend trial of physical therapy which I will refer her to, as well as an x-ray of the neck for initial evaluation.  If you could please instruct her to get this x-ray and expect a call for physical therapy.  If she would like to go to a specific location for physical therapy she needs to let us know because I sent it to [facility] for now.

## 2023-08-10 ENCOUNTER — HOSPITAL ENCOUNTER (OUTPATIENT)
Dept: BONE DENSITY | Facility: HOSPITAL | Age: 63
Discharge: HOME | End: 2023-08-10
Payer: COMMERCIAL

## 2023-08-10 ENCOUNTER — HOSPITAL ENCOUNTER (OUTPATIENT)
Dept: GENERAL RADIOLOGY | Facility: HOSPITAL | Age: 63
Discharge: HOME | End: 2023-08-10
Payer: COMMERCIAL

## 2023-08-10 DIAGNOSIS — M54.12 LEFT CERVICAL RADICULOPATHY: ICD-10-CM

## 2023-08-10 PROCEDURE — 72040 X-RAY EXAM NECK SPINE 2-3 VW: CPT

## 2023-08-10 PROCEDURE — 77080 DXA BONE DENSITY AXIAL: CPT

## 2023-08-11 NOTE — PROGRESS NOTES
Unfortunately the x ray of the neck does show a degree of osteoarthritis, which is a common cause of the symptoms you are experiencing. However, if things are not improving with physical therapy and time, we should discuss additional evaluation with further imaging by MRI or nerve testing.

## 2023-08-28 ENCOUNTER — TREATMENT (OUTPATIENT)
Dept: PHYSICAL THERAPY | Facility: CLINIC | Age: 63
End: 2023-08-28
Payer: COMMERCIAL

## 2023-08-28 DIAGNOSIS — M54.12 RADICULOPATHY, CERVICAL: Primary | ICD-10-CM

## 2023-08-28 DIAGNOSIS — M54.2 PAIN, NECK: ICD-10-CM

## 2023-08-28 DIAGNOSIS — Z74.09 IMPAIRED FUNCTIONAL MOBILITY AND ACTIVITY TOLERANCE: ICD-10-CM

## 2023-08-28 NOTE — PROGRESS NOTES
"    Physical Therapy Initial Evaluation and Plan of Care  Clinic Location [address], [place] [id number]    Patient: Tracee Knutson   : 1960  Diagnosis/ICD-10 Code:  Radiculopathy, cervical [M54.12]  Referring practitioner: Louis Guerrero MD  Date of Initial Visit: 2023  Today's Date: 2023  Patient seen for 1 session         Visit Diagnoses:    ICD-10-CM ICD-9-CM   1. Radiculopathy, cervical  M54.12 723.4   2. Pain, neck  M54.2 723.1   3. Impaired functional mobility and activity tolerance  Z74.09 V49.89         Subjective Questionnaire: NDI:32/100      Subjective Evaluation    History of Present Illness  Mechanism of injury: Needle like sensation in L digits 2-4 & forearm x3 mos. Started yoga membership several months ago. Symptoms are intermittent. Increased sitting on toilet. L hand symptoms react to exercises given by PCP (cervical AROM). Reports no hand weakness. Base of neck always feels tight. Neck pain worse at night.  Advil prn  Relief while taking steroid pack, no lasting change    C spine Xray 8/10/23  \"...bilateral uncovertebral joint hypertrophy, right greater than left at C4-5 and bilateral uncovertebral joint hypertrophy left greater than right at C5-6 and minimal uncovertebral joint hypertrophy at C6-7. On the lateral view there is disc space narrowing and there are degenerative endplate changes at C4-5, C5-6 and C6-7 with a 1-2 mm degenerative anterolisthesis of C4 on C5 and 1 mm retrolisthesis of C5 on C6 with mild posterior spurring. The C7 on T1 articulation is not adequately assessed on the standard lateral or swimmer's lateral views although there is the suggestion on the standard lateral view that there is a 2-3 mm anterolisthesis of C7 on T1.\"  This report was finalized on 2023 7:05 AM by Dr. Nicolas Vigil M.D.        Patient Occupation: Retired, free kenna  Pain  Current pain ratin  At worst pain ratin  Location: Neck, L " hand  Quality: needle-like, burning and tight  Relieving factors: medications  Aggravating factors: prolonged positioning  Progression: worsening    Social Support  Lives with: spouse    Hand dominance: right    Diagnostic Tests  X-ray: abnormal    Treatments  Previous treatment: medication (Accupuncture)  Current treatment: physical therapy  Patient Goals  Patient goals for therapy: decreased pain           Objective          Palpation   Left   Hypertonic in the upper trapezius.   Tenderness of the levator scapulae.     Left Shoulder Comments  Left upper trapezius: L shoulder elevated.     Active Range of Motion   Cervical/Thoracic Spine   Cervical    Flexion: 55 (Pain base of neck) degrees   Extension: 45 (Mild L hand tingling) degrees   Left lateral flexion: 32 (Pain base of neck) degrees   Right lateral flexion: 18 degrees   Left rotation: 52 degrees   Right rotation: 55 degrees     Strength/Myotome Testing     Left Shoulder     Planes of Motion   Flexion: 5   Abduction: 5   External rotation at 0ø: 4   Internal rotation at 0ø: 5     Right Shoulder     Planes of Motion   Flexion: 5   Abduction: 5   External rotation at 0ø: 4+   Internal rotation at 0ø: 5     Left Wrist/Hand   Wrist extension: 5  Wrist flexion: 5     (2nd hand position)   Left  strength (2nd hand position) 48 lbs    Right Wrist/Hand   Wrist extension: 5  Wrist flexion: 5     (2nd hand position)   Right  strength (2nd hand position) 55 lbs    Tests   Cervical     Left   Positive active compression ([place]) (Axial, not [place]) and Spurling's sign.         Assessment & Plan       Assessment  Impairments: abnormal muscle tone, abnormal or restricted ROM, activity intolerance, lacks appropriate home exercise program and pain with function   Functional limitations: sleeping, uncomfortable because of pain and sitting   Assessment details: Pt presents with symptoms of L cervical radiculopathy, pain w/ cervical AROM, neck tenderness, and  positive Spurling's and axial compression tests. Will benefit from skilled PT services in order to address listed impairments and increase tolerance to normal daily activities including ADLs and recreational activities.    Prognosis: good    Goals  Plan Goals: Short Term Goals: 2-4 weeks  Patient will:  1. Be independent and compliant with initial HEP  2. Be instructed in posture and body mechanics to reduce cervical nerve root compression  3. Report >/=25% reduction in pain with all daily activities    Long Term Goals: 6-12 weeks  Patient will:  1. Report pain of </= 1/10 with all daily activities  2. Demonstrate no soft tissue restriction in involved musculature to allow cervical AROM WFL  3. Return to yoga without increased symptoms.  4. Perceived disability </=10% as measured by Neck Disability Index    Plan  Therapy options: will be seen for skilled therapy services  Planned modality interventions: cryotherapy, electrical stimulation/Russian stimulation, thermotherapy (hydrocollator packs), traction, ultrasound and dry needling  Planned therapy interventions: abdominal trunk stabilization, ADL retraining, balance/weight-bearing training, body mechanics training, flexibility, functional ROM exercises, gait training, home exercise program, joint mobilization, manual therapy, neuromuscular re-education, soft tissue mobilization, spinal/joint mobilization, strengthening, stretching and therapeutic activities  Frequency: 2x week  Duration in weeks: 12  Treatment plan discussed with: patient      History # of Personal Factors and/or Comorbidities: LOW (0)  Examination of Body System(s): # of elements: LOW (1-2)  Clinical Presentation: STABLE   Clinical Decision Making: LOW       Timed:         Manual Therapy:    0     mins  32297;     Therapeutic Exercise:    15     mins  76548;     Neuromuscular Teresita:    0    mins  13427;    Therapeutic Activity:     0     mins  64434;     Gait Trainin     mins  09166;      Ultrasound:     0     mins  51748;    Ionto                               0    mins   74689  Self Care                       0     mins   91626  Canalith Repos    0     mins 83942      Un-Timed:  Electrical Stimulation:    0     mins  40924 ( );  Dry Needling     0     mins self-pay  Traction     10     mins 26062  Low Eval     25     Mins  51935  Mod Eval     0     Mins  40469  High Eval                       0     Mins  74040        Timed Treatment:   15   mins   Total Treatment:     50   mins          PT: Hyun Jacob PT     License Number: [id number]  Electronically signed by Hyun Jacob PT, 08/28/23, 2:28 PM EDT    Certification Period: 8/28/2023 thru 11/25/2023  I certify that the therapy services are furnished while this patient is under my care.  The services outlined above are required by this patient, and will be reviewed every 90 days.         Physician Signature:__________________________________________________    PHYSICIAN: Louis Guerrero MD  NPI: [id number]                                      DATE:      Please sign and return via fax to [email or web address] . Thank you, [facility].

## 2023-08-28 NOTE — PATIENT INSTRUCTIONS
Access Code: [email or web address]  URL: [email or web address]  Date: 08/28/2023  Prepared by: Hyun Jacob    Exercises  - Seated Upper Trapezius Stretch  - 1 x daily - 7 x weekly - 1 sets - 3 reps - 20s hold  - Standing Median Nerve Glide  - 1 x daily - 7 x weekly - 1 sets - 10 reps  - Seated Scapular Retraction  - 1 x daily - 7 x weekly - 1 sets - 10 reps - 5s hold  - Supine Chin Tuck  - 1 x daily - 7 x weekly - 1 sets - 10 reps - 5s hold

## 2023-08-30 ENCOUNTER — TREATMENT (OUTPATIENT)
Dept: PHYSICAL THERAPY | Facility: CLINIC | Age: 63
End: 2023-08-30
Payer: COMMERCIAL

## 2023-08-30 DIAGNOSIS — Z74.09 IMPAIRED FUNCTIONAL MOBILITY AND ACTIVITY TOLERANCE: ICD-10-CM

## 2023-08-30 DIAGNOSIS — M54.12 RADICULOPATHY, CERVICAL: Primary | ICD-10-CM

## 2023-08-30 DIAGNOSIS — M54.2 PAIN, NECK: ICD-10-CM

## 2023-08-30 NOTE — PROGRESS NOTES
Physical Therapy Daily Treatment Note      Patient: Tracee Knutson   : 1960  Referring practitioner: Louis Guerrero MD  Date of Initial Visit: Type: THERAPY  Noted: 2023  Today's Date: 2023  Patient seen for 2 sessions       Visit Diagnoses:    ICD-10-CM ICD-9-CM   1. Radiculopathy, cervical  M54.12 723.4   2. Pain, neck  M54.2 723.1   3. Impaired functional mobility and activity tolerance  Z74.09 V49.89       Subjective   Upper back/base of neck sore after first visit. L UE tingling when sitting on toilet.    Objective   See Exercise, Manual, and Modality Logs for complete treatment.       Assessment/Plan  Most tender at upper thoracic SPs. Tolerated STM and joint mobilization well. Updated HEP w/ thoracic AAROM/AROM. Fair tolerance to cervical traction despite suboccipital tenderness.    Timed:         Manual Therapy:    8     mins  38340;     Therapeutic Exercise:    23     mins  86482;     Neuromuscular Teresita:    0    mins  03328;    Therapeutic Activity:     0     mins  70797;     Gait Trainin     mins  65183;     Ultrasound:     0     mins  36494;    Ionto                               0    mins   32142  Traction 10 mins        Timed Treatment:   31   mins   Total Treatment:     41   mins    Hyun Jacob, PT  [place] License: [id number]

## 2023-09-07 ENCOUNTER — TREATMENT (OUTPATIENT)
Dept: PHYSICAL THERAPY | Facility: CLINIC | Age: 63
End: 2023-09-07
Payer: COMMERCIAL

## 2023-09-07 DIAGNOSIS — Z74.09 IMPAIRED FUNCTIONAL MOBILITY AND ACTIVITY TOLERANCE: ICD-10-CM

## 2023-09-07 DIAGNOSIS — M54.2 PAIN, NECK: ICD-10-CM

## 2023-09-07 DIAGNOSIS — M54.12 RADICULOPATHY, CERVICAL: Primary | ICD-10-CM

## 2023-09-07 NOTE — PROGRESS NOTES
Physical Therapy Daily Treatment Note      Patient: Tracee Knutson   : 1960  Referring practitioner: Louis Guerrero MD  Date of Initial Visit: Type: THERAPY  Noted: 2023  Today's Date: 2023  Patient seen for 3 sessions       Visit Diagnoses:    ICD-10-CM ICD-9-CM   1. Radiculopathy, cervical  M54.12 723.4   2. Pain, neck  M54.2 723.1   3. Impaired functional mobility and activity tolerance  Z74.09 V49.89       Subjective   Pt reports she is most bothered by upper back pain. Continues to have intermittent L arm tingling.    Objective   See Exercise, Manual, and Modality Logs for complete treatment.     Assessment/Plan  Tolerated new T spine mobility and postural strength and endurance exercises well w/o c/o pain. Demonstrates excellent shoulder AROM despite thoracic kyphosis. Updated HEP. Progress per POC.      Timed:         Manual Therapy:    8     mins  63839;     Therapeutic Exercise:    23     mins  95101;     Neuromuscular Teresita:    0    mins  33382;    Therapeutic Activity:     0     mins  33508;     Gait Trainin     mins  19005;     Ultrasound:     0     mins  94751;    Ionto                               0    mins   70461  Traction 10 mins    Timed Treatment:   31   mins   Total Treatment:     41   mins    Hyun Jacob, PT  [place] License: [id number]

## 2023-09-11 ENCOUNTER — TREATMENT (OUTPATIENT)
Dept: PHYSICAL THERAPY | Facility: CLINIC | Age: 63
End: 2023-09-11
Payer: COMMERCIAL

## 2023-09-11 DIAGNOSIS — M54.2 PAIN, NECK: ICD-10-CM

## 2023-09-11 DIAGNOSIS — M54.12 RADICULOPATHY, CERVICAL: Primary | ICD-10-CM

## 2023-09-11 DIAGNOSIS — Z74.09 IMPAIRED FUNCTIONAL MOBILITY AND ACTIVITY TOLERANCE: ICD-10-CM

## 2023-09-14 ENCOUNTER — TREATMENT (OUTPATIENT)
Dept: PHYSICAL THERAPY | Facility: CLINIC | Age: 63
End: 2023-09-14
Payer: COMMERCIAL

## 2023-09-14 DIAGNOSIS — Z20.822 CLOSE EXPOSURE TO COVID-19 VIRUS: Primary | ICD-10-CM

## 2023-09-14 DIAGNOSIS — Z74.09 IMPAIRED FUNCTIONAL MOBILITY AND ACTIVITY TOLERANCE: ICD-10-CM

## 2023-09-14 DIAGNOSIS — M54.12 RADICULOPATHY, CERVICAL: Primary | ICD-10-CM

## 2023-09-14 DIAGNOSIS — M54.2 PAIN, NECK: ICD-10-CM

## 2023-09-14 NOTE — PROGRESS NOTES
Physical Therapy Daily Treatment Note      Patient: Tracee Knutson   : 1960  Referring practitioner: Louis Guerrero MD  Date of Initial Visit: Type: THERAPY  Noted: 2023  Today's Date: 2023  Patient seen for 4 sessions       Visit Diagnoses:    ICD-10-CM ICD-9-CM   1. Radiculopathy, cervical  M54.12 723.4   2. Pain, neck  M54.2 723.1   3. Impaired functional mobility and activity tolerance  Z74.09 V49.89       Subjective   No new changes.    Objective   See Exercise, Manual, and Modality Logs for complete treatment.     Assessment/Plan  Responding well to manual interventions, exercise, and traction reporting decreased neck and L arm symptoms. Progress per POC.    Timed:         Manual Therapy:    10     mins  79986;     Therapeutic Exercise:    20     mins  90975;     Neuromuscular Teresita:    0    mins  30924;    Therapeutic Activity:     0     mins  84110;     Gait Trainin     mins  03445;     Ultrasound:     0     mins  76447;    Ionto                               0    mins   12651  Traction 10 mins        Timed Treatment:   30   mins   Total Treatment:     40   mins    Hyun Jacob, PT  [place] License: [id number]

## 2023-09-14 NOTE — PROGRESS NOTES
Physical Therapy Daily Treatment Note      Patient: Tracee Knutson   : 1960  Referring practitioner: Louis Guerrero MD  Date of Initial Visit: Type: THERAPY  Noted: 2023  Today's Date: 2023  Patient seen for 5 sessions       Visit Diagnoses:    ICD-10-CM ICD-9-CM   1. Radiculopathy, cervical  M54.12 723.4   2. Pain, neck  M54.2 723.1   3. Impaired functional mobility and activity tolerance  Z74.09 V49.89       Subjective   Less L UE tingling. At night, upper back aches. Sleeps on back and sides.    Objective   See Exercise, Manual, and Modality Logs for complete treatment.       Assessment/Plan  Significant tenderness at T1-4 and w/ joint mobilization. Requires tactile cuing for scapular retraction w/ prone T. May benefit from dry needling; issued brochure. Continues to report symptom improvement w/ cervical traction. Progress per POC.    Timed:         Manual Therapy:    10     mins  00447;     Therapeutic Exercise:    10     mins  96486;     Neuromuscular Teresita:    8    mins  81950;    Therapeutic Activity:     0     mins  72704;     Gait Trainin     mins  75175;     Ultrasound:     0     mins  85122;    Ionto                               0    mins   28005  Traction 10 mins        Timed Treatment:   28   mins   Total Treatment:     38   mins    PADMINI Aldana License: [id number]

## 2023-09-19 ENCOUNTER — TREATMENT (OUTPATIENT)
Dept: PHYSICAL THERAPY | Facility: CLINIC | Age: 63
End: 2023-09-19
Payer: COMMERCIAL

## 2023-09-19 DIAGNOSIS — Z74.09 IMPAIRED FUNCTIONAL MOBILITY AND ACTIVITY TOLERANCE: ICD-10-CM

## 2023-09-19 DIAGNOSIS — M54.2 PAIN, NECK: ICD-10-CM

## 2023-09-19 DIAGNOSIS — M54.12 RADICULOPATHY, CERVICAL: Primary | ICD-10-CM

## 2023-09-19 NOTE — PROGRESS NOTES
Physical Therapy Daily Treatment Note      Patient: Tracee Knutson   : 1960  Referring practitioner: Louis Guerrero MD  Date of Initial Visit: Type: THERAPY  Noted: 2023  Today's Date: 2023  Patient seen for 6 sessions       Visit Diagnoses:    ICD-10-CM ICD-9-CM   1. Radiculopathy, cervical  M54.12 723.4   2. Pain, neck  M54.2 723.1   3. Impaired functional mobility and activity tolerance  Z74.09 V49.89       Subjective   Continued pain at base of neck. Would like to try dry needling.    Objective   TTP C7-T3 Sps, paraspinals  Hypertonic upper trapezius B    See Exercise, Manual, and Modality Logs for complete treatment.     Assessment/Plan  Soft tissue was assessed at cervical and thoracic spine. PT noted point tenderness as well as palpable trigger points within the muscle tissue (above). On this date patient stated that they would like to undergo a dry needling procedure for the soft tissue dysfunction.   Patient was educated on the procedure for dry needling and consent waver was signed. Patient was informed of the risks, possible adverse effects, along with the benefits of TDN.     Pt reported moderate pain w/ needle insertion, so I limited pistoning to avoid excessive pain/post-needling soreness.    Progress per POC.    Timed:         Manual Therapy:    0     mins  04485;     Therapeutic Exercise:    0     mins  56608;     Neuromuscular Teresita:    0    mins  23569;    Therapeutic Activity:     0     mins  62625;     Gait Trainin     mins  47431;     Ultrasound:     0     mins  27422;    Ionto                               0    mins   39062  Dry needling 25 mins        Timed Treatment:   25   mins   Total Treatment:     35   mins    Hyun Jacob, PT  [place] License: [id number]

## 2023-09-25 ENCOUNTER — TELEPHONE (OUTPATIENT)
Dept: PHYSICAL THERAPY | Facility: OTHER | Age: 63
End: 2023-09-25

## 2023-09-25 NOTE — TELEPHONE ENCOUNTER
Caller: Tracee Knutson    Relationship: Self    What was the call regarding: PATIENT CANCELLED APPT TODAY DUE TO INSURANCE

## 2023-10-06 DIAGNOSIS — E78.5 HYPERLIPIDEMIA, UNSPECIFIED HYPERLIPIDEMIA TYPE: ICD-10-CM

## 2023-10-06 RX ORDER — ATORVASTATIN CALCIUM 10 MG/1
TABLET, FILM COATED ORAL
Qty: 60 TABLET | Refills: 0 | Status: SHIPPED | OUTPATIENT
Start: 2023-10-06

## 2023-10-23 ENCOUNTER — LAB (OUTPATIENT)
Dept: INTERNAL MEDICINE | Facility: CLINIC | Age: 63
End: 2023-10-23
Payer: COMMERCIAL

## 2023-10-23 DIAGNOSIS — Z23 NEED FOR INFLUENZA VACCINATION: Primary | ICD-10-CM

## 2023-10-23 PROCEDURE — 90471 IMMUNIZATION ADMIN: CPT | Performed by: FAMILY MEDICINE

## 2023-10-23 PROCEDURE — 90686 IIV4 VACC NO PRSV 0.5 ML IM: CPT | Performed by: FAMILY MEDICINE

## 2024-01-22 DIAGNOSIS — E78.5 HYPERLIPIDEMIA, UNSPECIFIED HYPERLIPIDEMIA TYPE: ICD-10-CM

## 2024-01-23 ENCOUNTER — PRIOR AUTHORIZATION (OUTPATIENT)
Dept: INTERNAL MEDICINE | Facility: CLINIC | Age: 64
End: 2024-01-23
Payer: COMMERCIAL

## 2024-01-23 RX ORDER — ATORVASTATIN CALCIUM 10 MG/1
TABLET, FILM COATED ORAL
Qty: 60 TABLET | Refills: 0 | Status: SHIPPED | OUTPATIENT
Start: 2024-01-23

## 2024-01-23 NOTE — TELEPHONE ENCOUNTER
"PA for brand name Zoloft 100MG tablets denied via [employer].    Per denial letter:    \"Coverage is provided in situations where the brand product is being requested due to a formulation difference in the inactive ingredient(s) [Examples include: difference in dyes, fillers, preservatives] between the brand and the bioequivalent generic product which, per the prescriber, would result in a significant allergy or serious adverse reaction. Coverage cannot be authorized at this time.\"    Please advise.  "

## 2024-03-01 DIAGNOSIS — F43.10 PTSD (POST-TRAUMATIC STRESS DISORDER): ICD-10-CM

## 2024-03-01 DIAGNOSIS — F33.42 RECURRENT MAJOR DEPRESSIVE DISORDER, IN FULL REMISSION: ICD-10-CM

## 2024-03-01 NOTE — TELEPHONE ENCOUNTER
Rx Refill Note  Requested Prescriptions     Pending Prescriptions Disp Refills    Zoloft 100 MG tablet 90 tablet 3     Sig: Take 1 tablet by mouth daily      Last office visit with prescribing clinician: 7/25/2023   Last telemedicine visit with prescribing clinician: Visit date not found   Next office visit with prescribing clinician: 3/18/2024        Pt wanted a month supply to [facility] d/t connection problem with Stanley Snow LPN  03/01/24, 14:40 EST

## 2024-03-03 RX ORDER — SERTRALINE HCL 100 MG
TABLET ORAL
Qty: 90 TABLET | Refills: 3 | Status: SHIPPED | OUTPATIENT
Start: 2024-03-03

## 2024-03-15 ENCOUNTER — TELEPHONE (OUTPATIENT)
Dept: INTERNAL MEDICINE | Facility: CLINIC | Age: 64
End: 2024-03-15
Payer: MEDICARE

## 2024-03-15 DIAGNOSIS — M85.80 OSTEOPENIA, UNSPECIFIED LOCATION: ICD-10-CM

## 2024-03-15 DIAGNOSIS — R73.03 PREDIABETES: ICD-10-CM

## 2024-03-15 DIAGNOSIS — E55.9 VITAMIN D DEFICIENCY: Primary | ICD-10-CM

## 2024-03-15 DIAGNOSIS — Z13.89 SCREENING FOR BLOOD OR PROTEIN IN URINE: ICD-10-CM

## 2024-03-15 DIAGNOSIS — E78.5 HYPERLIPIDEMIA, UNSPECIFIED HYPERLIPIDEMIA TYPE: ICD-10-CM

## 2024-03-15 NOTE — TELEPHONE ENCOUNTER
Caller: Tracee Knutson    Relationship: Self    Best call back number: [phone]    What orders are you requesting (i.e. lab or imaging): PHYSICAL LABS    In what timeframe would the patient need to come in: MONDAY APRIL 8TH 8AM     Where will you receive your lab/imaging services:IN OFFICE

## 2024-03-18 NOTE — TELEPHONE ENCOUNTER
Lvm for pt letting them know they are scheduled for physical labs on 4/8/24 at 8am as requested.    Hub to read

## 2024-04-18 DIAGNOSIS — F43.10 PTSD (POST-TRAUMATIC STRESS DISORDER): ICD-10-CM

## 2024-04-18 DIAGNOSIS — F33.42 RECURRENT MAJOR DEPRESSIVE DISORDER, IN FULL REMISSION: ICD-10-CM

## 2024-04-18 RX ORDER — SERTRALINE HCL 100 MG
TABLET ORAL
Qty: 90 TABLET | Refills: 3 | Status: SHIPPED | OUTPATIENT
Start: 2024-04-18

## 2024-04-19 ENCOUNTER — TELEPHONE (OUTPATIENT)
Dept: INTERNAL MEDICINE | Facility: CLINIC | Age: 64
End: 2024-04-19
Payer: MEDICARE

## 2024-04-19 DIAGNOSIS — Z13.5 SCREENING FOR EYE CONDITION: Primary | ICD-10-CM

## 2024-04-19 NOTE — TELEPHONE ENCOUNTER
PT EYE DOCTOR NOW NEEDS REFERRAL TO BE SEEN. PLEASE PLACE REFERRAL     SHINE HAYES M.D.  [facility]  [facility], [place] [address]  P- [phone]       [phone]    F- [phone]

## 2024-04-30 DIAGNOSIS — E78.5 HYPERLIPIDEMIA, UNSPECIFIED HYPERLIPIDEMIA TYPE: ICD-10-CM

## 2024-04-30 RX ORDER — ATORVASTATIN CALCIUM 10 MG/1
TABLET, FILM COATED ORAL
Qty: 90 TABLET | Refills: 0 | Status: SHIPPED | OUTPATIENT
Start: 2024-04-30

## 2024-05-07 ENCOUNTER — TELEPHONE (OUTPATIENT)
Dept: INTERNAL MEDICINE | Facility: CLINIC | Age: 64
End: 2024-05-07

## 2024-05-07 ENCOUNTER — OFFICE VISIT (OUTPATIENT)
Dept: INTERNAL MEDICINE | Facility: CLINIC | Age: 64
End: 2024-05-07
Payer: MEDICARE

## 2024-05-07 VITALS
BODY MASS INDEX: 25.43 KG/M2 | HEART RATE: 64 BPM | SYSTOLIC BLOOD PRESSURE: 126 MMHG | HEIGHT: 66 IN | WEIGHT: 158.2 LBS | OXYGEN SATURATION: 97 % | TEMPERATURE: 97.5 F | DIASTOLIC BLOOD PRESSURE: 80 MMHG

## 2024-05-07 DIAGNOSIS — N28.1 RENAL CYST: ICD-10-CM

## 2024-05-07 DIAGNOSIS — M54.50 CHRONIC LOW BACK PAIN, UNSPECIFIED BACK PAIN LATERALITY, UNSPECIFIED WHETHER SCIATICA PRESENT: ICD-10-CM

## 2024-05-07 DIAGNOSIS — Z00.00 INITIAL MEDICARE ANNUAL WELLNESS VISIT: Primary | ICD-10-CM

## 2024-05-07 DIAGNOSIS — E55.9 VITAMIN D DEFICIENCY: ICD-10-CM

## 2024-05-07 DIAGNOSIS — Z12.12 ENCOUNTER FOR SCREENING FOR COLORECTAL MALIGNANT NEOPLASM: ICD-10-CM

## 2024-05-07 DIAGNOSIS — G89.29 CHRONIC LOW BACK PAIN, UNSPECIFIED BACK PAIN LATERALITY, UNSPECIFIED WHETHER SCIATICA PRESENT: ICD-10-CM

## 2024-05-07 DIAGNOSIS — R73.03 PREDIABETES: ICD-10-CM

## 2024-05-07 DIAGNOSIS — E78.5 HYPERLIPIDEMIA, UNSPECIFIED HYPERLIPIDEMIA TYPE: ICD-10-CM

## 2024-05-07 DIAGNOSIS — Z12.11 ENCOUNTER FOR SCREENING FOR COLORECTAL MALIGNANT NEOPLASM: ICD-10-CM

## 2024-05-07 DIAGNOSIS — K21.9 GASTROESOPHAGEAL REFLUX DISEASE WITHOUT ESOPHAGITIS: ICD-10-CM

## 2024-05-07 DIAGNOSIS — M85.80 OSTEOPENIA, UNSPECIFIED LOCATION: ICD-10-CM

## 2024-05-07 DIAGNOSIS — F33.42 RECURRENT MAJOR DEPRESSIVE DISORDER, IN FULL REMISSION: ICD-10-CM

## 2024-05-07 PROCEDURE — G0438 PPPS, INITIAL VISIT: HCPCS | Performed by: FAMILY MEDICINE

## 2024-07-24 DIAGNOSIS — E78.5 HYPERLIPIDEMIA, UNSPECIFIED HYPERLIPIDEMIA TYPE: ICD-10-CM

## 2024-07-24 RX ORDER — ATORVASTATIN CALCIUM 10 MG/1
TABLET, FILM COATED ORAL
Qty: 90 TABLET | Refills: 0 | Status: SHIPPED | OUTPATIENT
Start: 2024-07-24

## 2024-08-05 ENCOUNTER — OFFICE VISIT (OUTPATIENT)
Dept: INTERNAL MEDICINE | Facility: CLINIC | Age: 64
End: 2024-08-05
Payer: MEDICARE

## 2024-08-05 VITALS
WEIGHT: 159 LBS | HEART RATE: 80 BPM | SYSTOLIC BLOOD PRESSURE: 114 MMHG | OXYGEN SATURATION: 98 % | HEIGHT: 72 IN | BODY MASS INDEX: 21.54 KG/M2 | DIASTOLIC BLOOD PRESSURE: 72 MMHG

## 2024-08-05 DIAGNOSIS — G45.4 TGA (TRANSIENT GLOBAL AMNESIA): Primary | ICD-10-CM

## 2024-08-05 DIAGNOSIS — F41.9 ANXIETY: ICD-10-CM

## 2024-08-05 DIAGNOSIS — R51.9 PERSISTENT HEADACHES: ICD-10-CM

## 2024-08-05 PROCEDURE — 99214 OFFICE O/P EST MOD 30 MIN: CPT | Performed by: FAMILY MEDICINE

## 2024-08-05 RX ORDER — BUSPIRONE HYDROCHLORIDE 15 MG/1
TABLET ORAL
Qty: 30 TABLET | Refills: 1 | Status: SHIPPED | OUTPATIENT
Start: 2024-08-05

## 2024-08-05 NOTE — PROGRESS NOTES
Date of Encounter: 2024  Patient: Tracee Knutson,  1960    Subjective   History of Presenting Illness  Chief complaint: ED follow-up    Patient presented to [facility] 2024 with amnesia.  She underwent evaluation including a CTA of the head and neck, MRI of the brain, EEG, CT of the abdomen and pelvis, chest x-ray, and blood work.  There were no obvious underlying causes found.  She was diagnosed with transient global amnesia and instructed to follow-up with PCP.    On retrospect  does not recall anything about a day that was extraordinary.  He did have a bad case of COVID-19 when visiting [facility] in May-, and since then they have both been dealing with chronic headaches, although those headaches did not start until they returned to their condo.  She is not currently having a headache today.  When she has them she feels them on the top of her head, bilateral, does respond somewhat to acetaminophen.    Because of this episode of transient global amnesia she has been very anxious and stressed.  She has an appointment to see a therapist.  She is wondering about a medication to help with anxiety.    The following portions of the patient's history were reviewed and updated as appropriate: allergies, current medications, past family history, past medical history, past social history, past surgical history and problem list.    Patient Active Problem List   Diagnosis    Degeneration of lumbar or lumbosacral intervertebral disc    Depression    Chronic low back pain    Libido, decreased, due to medication    Hyperlipidemia    Osteopenia    Renal cyst    Gastroesophageal reflux disease without esophagitis    Prediabetes    Vitamin D deficiency    History of TGA (transient global amnesia) ()     Past Medical History:   Diagnosis Date    Benign paroxysmal positional vertigo due to bilateral vestibular disorder 2018    Cervicalgia 2017    Chronic right flank pain 2021     "Depression     Depression     Incidental lung nodule, > 3mm and < 8mm 1/14/2021    TIA (transient ischemic attack)      Past Surgical History:   Procedure Laterality Date    MIDDLE EAR SURGERY       Family History   Problem Relation Age of Onset    Depression Mother     Heart disease Mother     Heart disease Paternal Grandfather     Heart disease Paternal Grandmother     Heart disease Maternal Grandmother     Heart disease Maternal Grandfather        Current Outpatient Medications:     atorvastatin (LIPITOR) 10 MG tablet, TAKE 1 TABLET BY MOUTH ONE TIME DAILY FR CHOLESTEROL AND HEART HEALTH, Disp: 90 tablet, Rfl: 0    Cholecalciferol (D3) 50 MCG (2000 UT) tablet, Take 1 tab by mouth daily for vitamin D deficiency, Disp: 90 each, Rfl: 3    melatonin 1 MG tablet, Take  by mouth., Disp: , Rfl:     Zoloft 100 MG tablet, Take 1 tablet by mouth daily, Disp: 90 tablet, Rfl: 3    busPIRone (BUSPAR) 15 MG tablet, Take 1 tab PO TID prn anxiety, Disp: 30 tablet, Rfl: 1  Allergies   Allergen Reactions    Penicillins      Social History     Tobacco Use    Smoking status: Never     Passive exposure: Never    Smokeless tobacco: Never   Vaping Use    Vaping status: Never Used   Substance Use Topics    Alcohol use: No    Drug use: No          Objective   Physical Exam  Vitals:    08/05/24 1106   BP: 114/72   BP Location: Left arm   Patient Position: Sitting   Cuff Size: Adult   Pulse: 80   SpO2: 98%   Weight: 72.1 kg (159 lb)   Height: 195.6 cm (77\")     Body mass index is 18.85 kg/m².    Constitutional: NAD.  Psychiatric: Anxious affect.  Congruent mood.  Normal thought content.  Neurologic: Alert and oriented.  Normal speech pattern.  Good eye contact.     Assessment & Plan   Assessment and Plan  Very pleasant 64-year-old female with PTSD and recurrent major depressive disorder, hyperlipidemia, prediabetes, chronic lower back pain, osteopenia, GERD, who presents with the following     Diagnoses and all orders for this visit:    1. " History of TGA (transient global amnesia) (2024) (Primary): Episode of TGA with no obvious underlying cause found, workup was appropriate.  Open question about whether these new headaches are migrainous, or if this is a post-COVID event.  Regardless, odds are that this will not recur. however in context of persistent headaches I want her to check her carbon monoxide detectors and update them in the home.  We will also give her a sample of Nurtec in case these headaches cannot be managed with acetaminophen.  If they do respond to Nurtec we should consider migraine disorder which is an underlying risk factor.    2. Anxiety: Could consider benzodiazepines if worsening but that would increase risk of memory issues which we want to avoid  -     busPIRone (BUSPAR) 15 MG tablet; Take 1 tab PO TID prn anxiety  Dispense: 30 tablet; Refill: 1    3. Persistent headaches: Per #1    BMI is within normal parameters. No other follow-up for BMI required.    Louis Guerrero MD  Family Medicine  O: [phone]    Disclaimer: Parts of this note were dictated by speech recognition. Minor errors in transcription may be present. Please call if questions.

## 2024-08-20 ENCOUNTER — CLINICAL SUPPORT (OUTPATIENT)
Dept: INTERNAL MEDICINE | Facility: CLINIC | Age: 64
End: 2024-08-20
Payer: MEDICARE

## 2024-08-20 DIAGNOSIS — R35.0 URINARY FREQUENCY: Primary | ICD-10-CM

## 2024-08-20 LAB
BILIRUB BLD-MCNC: NEGATIVE MG/DL
CLARITY, POC: CLEAR
COLOR UR: YELLOW
GLUCOSE UR STRIP-MCNC: NEGATIVE MG/DL
KETONES UR QL: NEGATIVE
LEUKOCYTE EST, POC: NEGATIVE
NITRITE UR-MCNC: NEGATIVE MG/ML
PH UR: 7 [PH] (ref 5–8)
PROT UR STRIP-MCNC: NEGATIVE MG/DL
RBC # UR STRIP: ABNORMAL /UL
SP GR UR: 1.02 (ref 1–1.03)
UROBILINOGEN UR QL: ABNORMAL

## 2024-08-20 PROCEDURE — 81003 URINALYSIS AUTO W/O SCOPE: CPT | Performed by: FAMILY MEDICINE

## 2024-08-21 ENCOUNTER — OFFICE VISIT (OUTPATIENT)
Dept: INTERNAL MEDICINE | Facility: CLINIC | Age: 64
End: 2024-08-21
Payer: MEDICARE

## 2024-08-21 VITALS
DIASTOLIC BLOOD PRESSURE: 64 MMHG | SYSTOLIC BLOOD PRESSURE: 104 MMHG | TEMPERATURE: 97.7 F | RESPIRATION RATE: 16 BRPM | HEIGHT: 66 IN | HEART RATE: 82 BPM | OXYGEN SATURATION: 97 % | WEIGHT: 156 LBS | BODY MASS INDEX: 25.07 KG/M2

## 2024-08-21 DIAGNOSIS — R35.0 INCREASED URINARY FREQUENCY: Primary | ICD-10-CM

## 2024-08-21 PROCEDURE — 99213 OFFICE O/P EST LOW 20 MIN: CPT | Performed by: NURSE PRACTITIONER

## 2024-08-21 NOTE — PROGRESS NOTES
"Chief Complaint  Urinary Frequency (X a few weeks, Pt was in yesterday and left urine sample which did show blood present on dip. It was sent out for culture, pt still having issues. )    Subjective        Tracee Knutson presents to [facility]  History of Present Illness  Here for increased urinary frequency for unspecified number of weeks, maybe 2.     Came into the office yesterday without an appointment to drop off a urine sample. UA demonstrated a trace amount of blood, otherwise unremarkable. A urine culture has been sent out for evaluation.     She follows Dr. Felecia Kincaid with [facility]. Last time was about 1 year ago (no records available for review). Last office note from OB/GYN is from 10/17/2018 with Dr. Spencer Bridges for vaginal atrophy. Dr. Bridges recommend hormone cream, but she declined.     She is not losing control over her bladder, but has urine urgency. No pain and no incontinence. No issues with her Bms.       Objective   Vital Signs:  /64 (BP Location: Left arm, Patient Position: Sitting, Cuff Size: Adult)   Pulse 82   Temp 97.7 °F (36.5 °C) (Infrared)   Resp 16   Ht 167.6 cm (66\")   Wt 70.8 kg (156 lb)   SpO2 97%   BMI 25.18 kg/m²   Estimated body mass index is 25.18 kg/m² as calculated from the following:    Height as of this encounter: 167.6 cm (66\").    Weight as of this encounter: 70.8 kg (156 lb).       BMI is within normal parameters. No other follow-up for BMI required.      Physical Exam  Vitals and nursing note reviewed.   Constitutional:       General: She is not in acute distress.     Appearance: Normal appearance. She is normal weight. She is not ill-appearing, toxic-appearing or diaphoretic.   Skin:     General: Skin is warm and dry.   Neurological:      General: No focal deficit present.      Mental Status: She is alert and oriented to person, place, and time. Mental status is at baseline.   Psychiatric:         Mood and Affect: Mood " normal.         Behavior: Behavior normal.         Thought Content: Thought content normal.         Judgment: Judgment normal.        Result Review :                   Assessment and Plan   Patient is a very pleasant 64 year-old post-menopausal female with vaginal atrophy, hyperlipidemia, pre-diabetes, hx renal cyst, osteopenia, hx TGA, DDD of the lumbosacral spine here with increased urinary frequency for about 1 month.     Results for orders placed or performed in visit on 08/20/24   POC Urinalysis Dipstick    Specimen: Urine   Result Value Ref Range    Color Yellow Yellow, Straw, Dark Yellow, Brandy    Clarity, UA Clear Clear    Glucose, UA Negative Negative mg/dL    Bilirubin Negative Negative    Ketones, UA Negative Negative    Specific Gravity  1.025 1.005 - 1.030    Blood, UA Trace (A) Negative    pH, Urine 7.0 5.0 - 8.0    Protein, POC Negative Negative mg/dL    Urobilinogen, UA 0.2 E.U./dL Normal, 0.2 E.U./dL    Leukocytes Negative Negative    Nitrite, UA Negative Negative             Diagnoses and all orders for this visit:    1. Increased urinary frequency (Primary)    Most likely this is due to pelvic floor dysfunction.  Will treat for infection pending results from urine culture.   Advised to follow up with her OB/GYN Dr. Felecia Kincaid if no indication of infection with possible referral to PT.          Follow Up   Return for Follow up with Dr. Guerrero as needed..  Patient was given instructions and counseling regarding her condition or for health maintenance advice. Please see specific information pulled into the AVS if appropriate.

## 2024-08-23 ENCOUNTER — TELEPHONE (OUTPATIENT)
Dept: INTERNAL MEDICINE | Facility: CLINIC | Age: 64
End: 2024-08-23
Payer: MEDICARE

## 2024-08-23 LAB
BACTERIA UR CULT: NO GROWTH
BACTERIA UR CULT: NORMAL

## 2024-08-23 NOTE — TELEPHONE ENCOUNTER
Caller: Mattsusan Tracee    Relationship: Self    Best call back number: [phone]     Caller requesting test results: URINALYSIS    When was the test performed: 8-21-24    Where was the test performed: IN OFFICE    Additional notes: PATIENT WOULD REALLY LIKE A CALL WITH THE RESULTS PLEASE. PATIENT IS TRYING TO TRAVEL AND NEEDS TO GET THIS ISSUE RESOLVED.    THANK YOU,PLEASE CALL

## 2024-11-20 ENCOUNTER — EXTERNAL PBMM DATA (OUTPATIENT)
Dept: PHARMACY | Facility: OTHER | Age: 64
End: 2024-11-20
Payer: MEDICARE

## 2024-12-06 ENCOUNTER — POP HEALTH PHARMACY (OUTPATIENT)
Dept: PHARMACY | Facility: OTHER | Age: 64
End: 2024-12-06
Payer: MEDICARE

## 2024-12-06 NOTE — PROGRESS NOTES
Population Health Pharmacy Outreach      Tracee Knutson was called today to discuss medication adherence with ATORVASTATIN CALCIUM ([facility] COA REDUCTASE INHIBITORS) , as she was identified as having care opportunities.    Program Details    [facility]  Status: Enrolled  Effective Dates: 12/3/2024 - present  Responsible Staff: Karina Crum LPN          Opportunities Identified    Adherence- Cholesterol       Adherence and Medication Management    Adherence Questions   Patient Reported X Missed Doses in the Last 7 Days : 3 (takes every other day)  Reasons for Non-Adherence : Other - see comments  List other reason(s) for non-adherence or missed doses: reports having moderate leg muscle pain. Will discuss with provider at her next visit. Next scheduled visit is Spring 2025.  Interested in a 90 day supply? : Already receiving  Does this require clinical escalation to a pharmacist?: Y (patient taking medication differently than prescribed, takes every other day. Provider not aware.)         General Medication Management    Type of medication management: targeted medication review  Review Completed on: 12/6/24  Recipient: beneficiary  Provider: licensed practical nurse  Visit type: initial  Method of contact: by telephone           Medication Therapy Problems     Medication Therapy Recommendations  No medication therapy recommendations to display      Summary    Medication Management Summary    Topics discussed: adherence and missed doses discussed  Time spent: 61 - 75 min         Karina Crum LPN, 12/06/24, 2:18 PM EST.

## 2024-12-09 ENCOUNTER — POP HEALTH PHARMACY (OUTPATIENT)
Dept: PHARMACY | Facility: OTHER | Age: 64
End: 2024-12-09
Payer: MEDICARE

## 2024-12-11 ENCOUNTER — POP HEALTH PHARMACY (OUTPATIENT)
Dept: PHARMACY | Facility: OTHER | Age: 64
End: 2024-12-11
Payer: MEDICARE

## 2024-12-15 DIAGNOSIS — E78.5 HYPERLIPIDEMIA, UNSPECIFIED HYPERLIPIDEMIA TYPE: ICD-10-CM

## 2024-12-16 RX ORDER — ATORVASTATIN CALCIUM 10 MG/1
TABLET, FILM COATED ORAL
Qty: 90 TABLET | Refills: 1 | Status: SHIPPED | OUTPATIENT
Start: 2024-12-16

## 2025-01-13 ENCOUNTER — OFFICE VISIT (OUTPATIENT)
Dept: INTERNAL MEDICINE | Facility: CLINIC | Age: 65
End: 2025-01-13
Payer: MEDICARE

## 2025-01-13 VITALS
OXYGEN SATURATION: 95 % | TEMPERATURE: 97.1 F | HEART RATE: 68 BPM | DIASTOLIC BLOOD PRESSURE: 78 MMHG | SYSTOLIC BLOOD PRESSURE: 110 MMHG

## 2025-01-13 DIAGNOSIS — J01.00 SUBACUTE MAXILLARY SINUSITIS: Primary | ICD-10-CM

## 2025-01-13 DIAGNOSIS — J40 BRONCHITIS: ICD-10-CM

## 2025-01-13 PROCEDURE — 99214 OFFICE O/P EST MOD 30 MIN: CPT | Performed by: FAMILY MEDICINE

## 2025-01-13 RX ORDER — CEFDINIR 300 MG/1
300 CAPSULE ORAL 2 TIMES DAILY
Qty: 20 CAPSULE | Refills: 0 | Status: SHIPPED | OUTPATIENT
Start: 2025-01-13 | End: 2025-01-23

## 2025-01-13 NOTE — PROGRESS NOTES
Date of Encounter: 2025  Patient: Tracee Knutson,  1960    Subjective   History of Presenting Illness  Chief complaint: Sinusitis    Patient presents with over 2 weeks of sinusitis including symptoms of nasal congestion, facial pressure, cervical lymphadenopathy, fatigue, headache, and intermittently productive cough with brown sputum.  She was seen at urgent care on 1/3 and in the emergency department on  and was given symptomatic medications.  She was COVID-19 and influenza negative.   sick with similar illness but he has recovered.  Patient feels she is getting worse.  She has tried therapies such as nasal saline, steam treatment.  She has a history of sinusitis episodes a few times per year.    The following portions of the patient's history were reviewed and updated as appropriate: allergies, current medications, past family history, past medical history, past social history, past surgical history and problem list.    Patient Active Problem List   Diagnosis    Degeneration of lumbar or lumbosacral intervertebral disc    Depression    Chronic low back pain    Libido, decreased, due to medication    Hyperlipidemia    Osteopenia    Renal cyst    Gastroesophageal reflux disease without esophagitis    Prediabetes    Vitamin D deficiency    History of TGA (transient global amnesia) ()     Past Medical History:   Diagnosis Date    Benign paroxysmal positional vertigo due to bilateral vestibular disorder 2018    Cervicalgia 2017    Chronic right flank pain 2021    Depression     Depression     Incidental lung nodule, > 3mm and < 8mm 2021    TIA (transient ischemic attack)      Past Surgical History:   Procedure Laterality Date    MIDDLE EAR SURGERY       Family History   Problem Relation Age of Onset    Depression Mother     Heart disease Mother     Heart disease Paternal Grandfather     Heart disease Paternal Grandmother     Heart disease Maternal Grandmother     Heart  disease Maternal Grandfather        Current Outpatient Medications:     atorvastatin (LIPITOR) 10 MG tablet, TAKE 1 TABLET BY MOUTH ONE TIME DAILY FOR CHOLESTEROL AND HEART HEALTH, Disp: 90 tablet, Rfl: 1    Cholecalciferol (D3) 50 MCG (2000 UT) tablet, Take 1 tab by mouth daily for vitamin D deficiency, Disp: 90 each, Rfl: 3    melatonin 1 MG tablet, Take  by mouth., Disp: , Rfl:     Zoloft 100 MG tablet, Take 1 tablet by mouth daily, Disp: 90 tablet, Rfl: 3    cefdinir (OMNICEF) 300 MG capsule, Take 1 capsule by mouth 2 (Two) Times a Day for 10 days., Disp: 20 capsule, Rfl: 0    promethazine-dextromethorphan (PROMETHAZINE-DM) 6.25-15 MG/5ML syrup, Take 5 mL by mouth 4 (Four) Times a Day As Needed for Cough. (Patient not taking: Reported on 1/13/2025), Disp: 120 mL, Rfl: 0  Allergies   Allergen Reactions    Penicillins      Social History     Tobacco Use    Smoking status: Never     Passive exposure: Never    Smokeless tobacco: Never   Vaping Use    Vaping status: Never Used   Substance Use Topics    Alcohol use: No    Drug use: No          Objective   Physical Exam  Vitals:    01/13/25 0841   BP: 110/78   BP Location: Left arm   Patient Position: Sitting   Cuff Size: Adult   Pulse: 68   Temp: 97.1 °F (36.2 °C)   TempSrc: Infrared   SpO2: 95%     There is no height or weight on file to calculate BMI.    Constitutional: Fatigued appearing  Eyes: EOMI. PERRLA. Normal conjunctiva.  Allergic shiners.  Ear, nose, mouth, throat: Moderate postnasal drip.  There is bilateral maxillary sinus tenderness to palpation.  No frontal sinus tenderness to palpation.  Right ear effusion.  Normal left tympanic membrane boggy nasal mucosa with near obstruction of the right nasopharynx.  Cardiovascular: RRR. No murmurs. No LE edema b/l. Radial pulses 2+ bilaterally.  Pulmonary: CTA b/l. Good effort.  With coughing there is very mild end expiratory wheezing centrally.  Integumentary: No rashes or wounds on face or upper  extremities.  Lymphatic: Tender left jugulodigastric lymphadenopathy     Assessment & Plan   Assessment and Plan  Very pleasant 64-year-old female with PTSD and recurrent major depressive disorder, hyperlipidemia, prediabetes, chronic lower back pain, osteopenia, GERD, who presents with the following     Diagnoses and all orders for this visit:    1. Subacute maxillary sinusitis (Primary)  -     cefdinir (OMNICEF) 300 MG capsule; Take 1 capsule by mouth 2 (Two) Times a Day for 10 days.  Dispense: 20 capsule; Refill: 0    2. Bronchitis  -     cefdinir (OMNICEF) 300 MG capsule; Take 1 capsule by mouth 2 (Two) Times a Day for 10 days.  Dispense: 20 capsule; Refill: 0    Examination consistent with subacute sinusitis and bronchitis, not responding to conservative measures.  At this point I would like to treat as a postviral bacterial sinusitis with cefdinir which she has tolerated previously.  Discussed reasons to return for further evaluation.    Louis Guerrero MD  Family Medicine  O: [phone]    Disclaimer: Parts of this note were dictated by speech recognition. Minor errors in transcription may be present. Please call if questions.

## 2025-01-22 ENCOUNTER — TELEPHONE (OUTPATIENT)
Dept: INTERNAL MEDICINE | Facility: CLINIC | Age: 65
End: 2025-01-22
Payer: MEDICARE

## 2025-01-22 NOTE — TELEPHONE ENCOUNTER
Caller: Tracee Knutson    Relationship to patient: Self    Best call back number: [phone]     Patient is needing:   PATIENT RECEIVED LETTER IN MAIL ABOUT A REFERRAL TO AN OPTEMOLOGIST, HOWEVER SHE HAS HAD HER OWN OPTEMOLOGIST FOR YEARS.    SHE SEES DR. SHINE WILHELM      SHE IS UNSURE WHY SHE WAS SENT THIS LETTER.  PLEASE CALL TO DISCUSS.

## 2025-04-14 ENCOUNTER — PREP FOR SURGERY (OUTPATIENT)
Dept: OTHER | Facility: HOSPITAL | Age: 65
End: 2025-04-14
Payer: MEDICARE

## 2025-04-14 DIAGNOSIS — Z12.11 SCREEN FOR COLON CANCER: Primary | ICD-10-CM

## 2025-04-17 ENCOUNTER — OFFICE VISIT (OUTPATIENT)
Dept: FAMILY MEDICINE CLINIC | Facility: CLINIC | Age: 65
End: 2025-04-17
Payer: MEDICARE

## 2025-04-17 VITALS
WEIGHT: 159.6 LBS | BODY MASS INDEX: 25.65 KG/M2 | HEIGHT: 66 IN | DIASTOLIC BLOOD PRESSURE: 66 MMHG | TEMPERATURE: 98.7 F | OXYGEN SATURATION: 95 % | HEART RATE: 79 BPM | SYSTOLIC BLOOD PRESSURE: 120 MMHG

## 2025-04-17 DIAGNOSIS — I25.10 NONOBSTRUCTIVE ATHEROSCLEROSIS OF CORONARY ARTERY: ICD-10-CM

## 2025-04-17 DIAGNOSIS — K21.9 GASTROESOPHAGEAL REFLUX DISEASE WITHOUT ESOPHAGITIS: ICD-10-CM

## 2025-04-17 DIAGNOSIS — J06.9 VIRAL URI WITH COUGH: Primary | ICD-10-CM

## 2025-04-17 DIAGNOSIS — F51.04 PSYCHOPHYSIOLOGICAL INSOMNIA: ICD-10-CM

## 2025-04-17 DIAGNOSIS — Z12.11 SCREEN FOR COLON CANCER: ICD-10-CM

## 2025-04-17 DIAGNOSIS — F33.42 RECURRENT MAJOR DEPRESSIVE DISORDER, IN FULL REMISSION: ICD-10-CM

## 2025-04-17 DIAGNOSIS — M85.89 OSTEOPENIA OF MULTIPLE SITES: ICD-10-CM

## 2025-04-17 DIAGNOSIS — E78.00 PURE HYPERCHOLESTEROLEMIA: ICD-10-CM

## 2025-04-17 DIAGNOSIS — Z12.31 ENCOUNTER FOR SCREENING MAMMOGRAM FOR MALIGNANT NEOPLASM OF BREAST: ICD-10-CM

## 2025-04-17 DIAGNOSIS — Z78.0 POSTMENOPAUSAL: ICD-10-CM

## 2025-04-17 DIAGNOSIS — Z00.00 HEALTHCARE MAINTENANCE: ICD-10-CM

## 2025-04-17 DIAGNOSIS — F43.10 PTSD (POST-TRAUMATIC STRESS DISORDER): ICD-10-CM

## 2025-04-17 DIAGNOSIS — R73.03 PREDIABETES: ICD-10-CM

## 2025-04-17 LAB
EXPIRATION DATE: NORMAL
FLUAV AG UPPER RESP QL IA.RAPID: NOT DETECTED
FLUBV AG UPPER RESP QL IA.RAPID: NOT DETECTED
INTERNAL CONTROL: NORMAL
Lab: NORMAL
SARS-COV-2 AG UPPER RESP QL IA.RAPID: NOT DETECTED

## 2025-04-17 RX ORDER — SERTRALINE HCL 100 MG
TABLET ORAL
Qty: 90 TABLET | Refills: 3 | Status: SHIPPED | OUTPATIENT
Start: 2025-04-17

## 2025-04-17 RX ORDER — LIDOCAINE 50 MG/G
1 PATCH TOPICAL EVERY 24 HOURS
COMMUNITY
Start: 2025-04-05 | End: 2025-04-17

## 2025-04-17 RX ORDER — ONDANSETRON 4 MG/1
4 TABLET, ORALLY DISINTEGRATING ORAL EVERY 8 HOURS PRN
COMMUNITY
Start: 2025-04-05 | End: 2025-04-17

## 2025-04-17 RX ORDER — PANTOPRAZOLE SODIUM 40 MG/1
40 TABLET, DELAYED RELEASE ORAL DAILY
COMMUNITY
Start: 2025-04-05 | End: 2025-04-17

## 2025-04-17 NOTE — PROGRESS NOTES
Chief Complaint  Establish Care (Establishing care, not fasting, pt is experiencing URI symptoms she is requesting treatment for. Symptoms since Saturday.)    Subjective        Tracee Knutson presents to [facility]  History of Present Illness  History of Present Illness    The patient presents to establish care and for evaluation of URI sx x 5 days.  Previous PCP Dr. Guerrero, who is moving onto another practice    #URI with cough & congestion  - Began on 5 days ago while pt visiting brother in [place] with a sore throat, followed by a painful productive cough.  - A similar episode in January or February was treated with cefdinir.  - Promethazine-DM provides some relief, especially at night.  - Flonase and Zyrtec were started 4 days ago (taking 1 spray each nostril of flonase), as endorses this is the time of year when she gets spring allergies  - Concern about bronchitis due to severe cough.  - Pain managed with Tylenol, ibuprofen, and Advil.  - Lozenges and cough drops used for symptomatic relief.  - Sudafed has been beneficial in the past.  - She does have nasal saline rinse kit at home, but has not been using it since symptom onset    PAST HISTORY    #Seasonal Allergies  - Occur in spring and fall, managed with Zyrtec or Flonase as needed.    #PTSD  #Depression  - Patient is a survivor of the [place] war, which is etiology of PTSD  - Antidepressants used for ~20 years, with apprehension about discontinuation due to a previous severe episode of suicidality many years ago when attempting to dc.  - Better coping reported while on antidepressants.  -After an episode of unexplained amnesia (see below), she started counseling, which she found beneficial.  - She still sees that counselor once/month  - Refill of Zoloft required.  - Generic Zoloft not tried.    #Episode suspected transient global amnesia  - Seen in [facility] 07/2024 for Acute episode of memory loss  - CTA, MRI brain, and EEG  "within normal limits  - Episode self resolved by end of day, thought to be 2/2 transient global amnesia  - Patient reports after that episode, she newly started counseling, which as noted above she has found beneficial  - No recurrence of amnesia event since then    #Nonobstructive coronary artery disease  #Hyperlipidemia  #Strong family history of heart disease  - Managed with atorvastatin 10 mg.  - CT calcium score in April 2022 with Agatston score of 40 representing mild coronary atherosclerotic burden of the LAD  - Last lipid panel within normal limits (05/2024)    #GERD  - Recent ED ([facility]) visit for burning throat pain with negative cardiac workup in ED, thought to be secondary to GERD that symptoms improved with GI cocktail  - She took a 2-week course of pantoprazole and just completed  - Prefers to avoid medicines if she does not need them    #Osteopenia  - Last DEXA 08/2023 showed osteopenia of bilateral hips and lumbar spine  - Patient does take vitamin D supplementation, but has been hesitant to take calcium supplementation as her understanding of her brother's heart attack was that it was due to calcium deposits    #Chronic Low Back Pain  - Managed with lidocaine patches and acupuncture.    #Insomnia  - Managed with melatonin.  - Morning sleep better quality.  - Difficulty sleeping last night due to leg pain alleviated with Advil.    #Prediabetes  - Last A1c 5.9% (05/2024)    #HCM  Last mammogram 04/2024  Reports last Pap 2024 (records not available), done by OB at [facility]  Last colonoscopy 07/2014, referral for repeat colonoscopy ordered 05/2024.  Patient reports she is in the process of scheduling  Last DEXA 08/2023          Objective   Vital Signs:  /66   Pulse 79   Temp 98.7 °F (37.1 °C)   Ht 167.6 cm (66\")   Wt 72.4 kg (159 lb 9.6 oz)   SpO2 95%   BMI 25.76 kg/m²   Estimated body mass index is 25.76 kg/m² as calculated from the following:    Height as of this encounter: 167.6 cm " "(66\").    Weight as of this encounter: 72.4 kg (159 lb 9.6 oz).          Physical Exam  Constitutional:       General: She is not in acute distress.     Appearance: Normal appearance. She is not ill-appearing.   HENT:      Head: Normocephalic and atraumatic.      Comments: No sinus tenderness to palpation     Right Ear: Tympanic membrane, ear canal and external ear normal. There is no impacted cerumen.      Left Ear: Tympanic membrane, ear canal and external ear normal. There is no impacted cerumen.      Nose: Congestion (mild congestion nasal mucosa) and rhinorrhea (clear nasal dc present) present.      Comments: Nasal septum appears mildly deviated to the L     Mouth/Throat:      Mouth: Mucous membranes are moist.      Pharynx: Oropharynx is clear. No oropharyngeal exudate or posterior oropharyngeal erythema.   Eyes:      General:         Right eye: No discharge.         Left eye: No discharge.      Conjunctiva/sclera: Conjunctivae normal.   Cardiovascular:      Rate and Rhythm: Normal rate and regular rhythm.      Heart sounds: Normal heart sounds. No murmur heard.  Pulmonary:      Effort: Pulmonary effort is normal. No respiratory distress.      Breath sounds: Normal breath sounds. No stridor. No wheezing or rhonchi.   Lymphadenopathy:      Cervical: Cervical adenopathy (Nontender) present.   Neurological:      Mental Status: She is alert.        Physical Exam      Result Review :         Results  - Labs:    - COVID-19 test: Negative    - Influenza test: Negative    - Imaging:    - CT calcium score (04/2022): Mild calcification    - DEXA scan (08/2023): Osteopenia    - Diagnostic Testing:    - EEG (07/2024): No evidence of seizures             Assessment and Plan   Diagnoses and all orders for this visit:    1. Viral URI with cough (Primary)  - COVID-19 and influenza tests negative today  - Counseled symptoms most likely secondary to viral infection.  Discussed anticipation of self-limited illness and primary " management with supportive care  - Symptomatic treatment, rest, and hydration recommended  - Sent in prescription for combination Sudafed-dextromethorphan-guaifenesin syrup.  Patient advised if this not available at her pharmacy, to, instead, purchased OTC Mucinex DM plus pseudoephedrine tablets  - Continue Flonase 1 spray each nostril bid and advised starting nasal saline rinses twice daily prior to Flonase use    -     pseudoephedrine-dextromethorphan-guaifenesin (ROBITUSSIN-PE) 30- MG/5ML solution; Take 10 mL by mouth 4 (Four) Times a Day As Needed for Congestion or Cough.  Dispense: 355 mL; Refill: 0  -     POCT SARS-CoV-2 Antigen GREG + Flu    2. Nonobstructive atherosclerosis of coronary artery  3. Pure hypercholesterolemia  - CT calcium score in April 2022 with Agatston score of 40 representing mild coronary atherosclerotic burden of the LAD  - Patient advised continue atorvastatin 10 mg qd  - Due for repeat lipid panel    -     CBC & Differential; Future  -     Comprehensive Metabolic Panel; Future  -     Lipid Panel; Future  -     TSH Rfx On Abnormal To Free T4; Future    4. PTSD (post-traumatic stress disorder)  5. Recurrent major depressive disorder, in full remission  - Patient feels that symptoms fairly well-managed with combination of Zoloft 100 mg qd + therapy/counseling  - She specifically requests Zoloft brand name.  She has never tried generic sertraline, but states she would be afraid to do so per a good friend who is a psychiatrist telling her there is an important distinction between the 2  - She request Zoloft refill and indicates past issues with insurance coverage possibly requiring PA    -     Zoloft 100 MG tablet; Take 1 tablet by mouth daily  Dispense: 90 tablet; Refill: 3  -     CBC & Differential; Future    6. Psychophysiological insomnia  -Reviewed sleep hygiene measures and provided patient with handout  - Encouraged her to discuss mindfulness techniques for insomnia with her  therapist  - Also discussed possibility of considering medication such as trazodone or Seroquel to simultaneously treat depression and insomnia  - Patient will keep this in mind and return to clinic to further discuss if insomnia persists or worsens    -     Urinalysis With Microscopic If Indicated (No Culture) - Urine, Clean Catch; Future    7. Gastroesophageal reflux disease without esophagitis    8. Osteopenia of multiple sites  - Adequate calcium and vitamin D intake, along with weight-bearing exercises, recommended  - General blood work, including vitamin D levels, to be ordered post-cold symptoms  - Future order placed for DEXA, due 08/2025          -     DEXA Bone Density Axial; Future  -     Vitamin D,25-Hydroxy; Future    9. Prediabetes  -     Hemoglobin A1c; Future    10. Healthcare maintenance  11. Screen for colon cancer  12. Encounter for screening mammogram for malignant neoplasm of breast  13. Postmenopausal  - Next mammogram due 04/2025, ordered today  - Up-to-date on Pap smear, reports done in 2024 [facility] (records not available)  - Colorectal cancer screening due, patient reports she is in the process of getting this scheduled (order placed by previous PCP)  - Future order placed for DEXA scan, due 08/2025    -     Mammo Screening Digital Tomosynthesis Bilateral With CAD; Future  -     DEXA Bone Density Axial; Future    Assessment & Plan           Follow Up   Return in about 2 months (around 6/17/2025) for Medicare Wellness.  Patient was given instructions and counseling regarding her condition or for health maintenance advice. Please see specific information pulled into the AVS if appropriate.     Patient or patient representative verbalized consent for the use of Ambient Listening during the visit with  Jil Mackey MD for chart documentation. 4/17/2025  14:38 EDT

## 2025-04-18 ENCOUNTER — TELEPHONE (OUTPATIENT)
Dept: FAMILY MEDICINE CLINIC | Facility: CLINIC | Age: 65
End: 2025-04-18

## 2025-04-18 DIAGNOSIS — J06.9 VIRAL URI WITH COUGH: Primary | ICD-10-CM

## 2025-04-18 RX ORDER — BROMPHENIRAMINE MALEATE, PSEUDOEPHEDRINE HYDROCHLORIDE, AND DEXTROMETHORPHAN HYDROBROMIDE 2; 10; 30 MG/5ML; MG/5ML; MG/5ML
10 SYRUP ORAL 4 TIMES DAILY PRN
Qty: 118 ML | Refills: 0 | Status: SHIPPED | OUTPATIENT
Start: 2025-04-18

## 2025-04-18 NOTE — TELEPHONE ENCOUNTER
Pharmacy Name:  [id number] - [place], [place] - [address] - [phone]  - [phone]      Pharmacy representative name: ИВАН    Pharmacy representative phone number: [phone]     What medication are you calling in regards to: seudoephedrine-dextromethorphan-guaifenesin (ROBITUSSIN-PE) 30- MG/5ML solution     What question does the pharmacy have: PHARMACY STATED THEY ARE UNABLE TO GET THIS COMBINATION MEDICATION IN, HOWEVER THEY ARE ABLE TO GET BROMPHEN.    PHARMACY IS REQUESTING A NEW PRESCRIPTION BE SENT FOR BROMPHEN INSTEAD, SO THEY MAY FILL PATIENTS COUGH MEDICATION PRESCRIPTION.    Who is the provider that prescribed the medication: DYLON VEGAS    Additional notes: PHARMACY IS REQUESTING THIS BE SENT ASAP

## 2025-05-13 ENCOUNTER — TELEPHONE (OUTPATIENT)
Dept: SURGERY | Facility: CLINIC | Age: 65
End: 2025-05-13
Payer: MEDICARE

## 2025-06-16 DIAGNOSIS — E78.5 HYPERLIPIDEMIA, UNSPECIFIED HYPERLIPIDEMIA TYPE: ICD-10-CM

## 2025-06-16 RX ORDER — ATORVASTATIN CALCIUM 10 MG/1
TABLET, FILM COATED ORAL
Qty: 90 TABLET | Refills: 0 | Status: SHIPPED | OUTPATIENT
Start: 2025-06-16 | End: 2025-06-18 | Stop reason: SDUPTHER

## 2025-06-18 ENCOUNTER — OFFICE VISIT (OUTPATIENT)
Dept: FAMILY MEDICINE CLINIC | Facility: CLINIC | Age: 65
End: 2025-06-18
Payer: MEDICARE

## 2025-06-18 VITALS
WEIGHT: 158.2 LBS | TEMPERATURE: 98.6 F | HEART RATE: 70 BPM | BODY MASS INDEX: 25.43 KG/M2 | HEIGHT: 66 IN | DIASTOLIC BLOOD PRESSURE: 76 MMHG | SYSTOLIC BLOOD PRESSURE: 110 MMHG | OXYGEN SATURATION: 97 %

## 2025-06-18 DIAGNOSIS — I25.10 NONOBSTRUCTIVE ATHEROSCLEROSIS OF CORONARY ARTERY: ICD-10-CM

## 2025-06-18 DIAGNOSIS — K21.9 GASTROESOPHAGEAL REFLUX DISEASE, UNSPECIFIED WHETHER ESOPHAGITIS PRESENT: ICD-10-CM

## 2025-06-18 DIAGNOSIS — M85.89 OSTEOPENIA OF MULTIPLE SITES: ICD-10-CM

## 2025-06-18 DIAGNOSIS — Z00.00 MEDICARE ANNUAL WELLNESS VISIT, SUBSEQUENT: Primary | ICD-10-CM

## 2025-06-18 DIAGNOSIS — F41.8 SITUATIONAL ANXIETY: ICD-10-CM

## 2025-06-18 DIAGNOSIS — F43.10 PTSD (POST-TRAUMATIC STRESS DISORDER): ICD-10-CM

## 2025-06-18 DIAGNOSIS — E78.00 PURE HYPERCHOLESTEROLEMIA: ICD-10-CM

## 2025-06-18 DIAGNOSIS — R73.03 PREDIABETES: ICD-10-CM

## 2025-06-18 RX ORDER — PANTOPRAZOLE SODIUM 40 MG/1
40 TABLET, DELAYED RELEASE ORAL DAILY
Qty: 30 TABLET | Refills: 1 | Status: SHIPPED | OUTPATIENT
Start: 2025-06-18

## 2025-06-18 RX ORDER — ATORVASTATIN CALCIUM 10 MG/1
TABLET, FILM COATED ORAL
Qty: 90 TABLET | Refills: 3 | Status: SHIPPED | OUTPATIENT
Start: 2025-06-18

## 2025-06-18 NOTE — PROGRESS NOTES
Subjective   The ABCs of the Annual Wellness Visit  Medicare Wellness Visit    Tracee Knutson is a 65 y.o. patient who presents for a Medicare Wellness Visit.    The patient presents for a routine Medicare wellness visit, follow-up on lab results, and evaluation of recurrent GERD symptoms in setting of life stressors    #Medicare wellness  - Has a living will on file  - Visits the dentist every 3 months  - Updated her glasses prescription within last 1 year  - Received an influenza vaccine in fall 2024  - Has not received a Tdap booster in the last 10 years or the pneumococcal vaccine  - Believes her physical health has improved compared to last year despite ongoing stress related to her 's health issues  - Under the care of a gynecologist and ophthalmologist    #Healthcare maintenance  - Last mammogram 04/2024. Pt has upcoming mammogram scheduled 8/12  - Reports last Pap 2024 (records not available), done by OB at [facility]  - Last colonoscopy 07/2014.  Has upcoming colonoscopy scheduled 9/3  - Last DEXA 08/2023.  Has upcoming DEXA scan scheduled 8/12    #GERD  - Exacerbated symptoms due to stress from a family emergency involving her sister-in-law's illness and surgery  - Experiences back pain associated with stress, described as a burning sensation radiating to the front  - Cardiac workup in the ER last week was normal  - Completed a 2-week course of pantoprazole, which alleviated symptoms  - Requests a refill for as-needed use    #History of nephrolithiasis  #History of chronic trace hematuria  - Past history of kidney stones and renal cysts  - Most recent imaging (CT abdomen/pelvis 07/2024) showed no stones  - Reports chronic trace blood in her urine    #PTSD  #Depression  - On Zoloft for 15 years, which helps manage her depression and anxiety  - Continues to see her therapist once/month  - Not under psychiatric care      Supplemental information: She was hospitalized overnight in 07/2024 due to  transient global amnesia and continues to see her counselor.    The following portions of the patient's history were reviewed and   updated as appropriate: allergies, current medications, past family history, past medical history, past social history, past surgical history, and problem list      Compared to one year ago, the patient's physical   health is better.  Compared to one year ago, the patient's mental   health is better.    Recent Hospitalizations:  She was admitted within the past 365 days at [facility] from 7/29-7/31/2020 for for transient global amnesia (discussed in office visit/17/2025)    Current Medical Providers:  Patient Care Team:  Jil Mackey MD as PCP - General (Family Medicine)  Felecia Kincaid MD as Consulting Physician (Obstetrics and Gynecology)  Donnell Archer MD as Consulting Physician (Ophthalmology)    Outpatient Medications Prior to Visit   Medication Sig Dispense Refill    Cholecalciferol (D3) 50 MCG (2000 UT) tablet Take 1 tab by mouth daily for vitamin D deficiency 90 each 3    melatonin 1 MG tablet Take  by mouth.      Zoloft 100 MG tablet Take 1 tablet by mouth daily 90 tablet 3    atorvastatin (LIPITOR) 10 MG tablet TAKE 1 TABLET BY MOUTH ONE TIME DAILY FOR CHOLESTEROL AND HEART HEALTH 90 tablet 0    Lzmvujdxs-Yblnappo-AE (Srfsloox-Tvumdeeyu-HX) 2-30-10 MG/5ML syrup Take 10 mL by mouth 4 (Four) Times a Day As Needed (cough). 118 mL 0     No facility-administered medications prior to visit.     No opioid medication identified on active medication list. I have reviewed chart for other potential  high risk medication/s and harmful drug interactions in the elderly.      Aspirin is not on active medication list.  Aspirin use is not indicated based on review of current medical condition/s. Risk of harm outweighs potential benefits.        Patient Active Problem List   Diagnosis    Degeneration of lumbar or lumbosacral intervertebral disc    Depression    Chronic low back  "pain    Libido, decreased, due to medication    Hyperlipidemia    Osteopenia    Renal cyst    Gastroesophageal reflux disease without esophagitis    Prediabetes    Vitamin D deficiency    History of TGA (transient global amnesia) (2024)    Screen for colon cancer    Nonobstructive atherosclerosis of coronary artery    PTSD (post-traumatic stress disorder)    Psychophysiological insomnia     Advance Care Planning Advance Directive is not on file.  ACP discussion was held with the patient during this visit. Patient has an advance directive (not in EMR), copy requested.            Objective   Vitals:    06/18/25 1244   BP: 110/76   Pulse: 70   Temp: 98.6 °F (37 °C)   SpO2: 97%   Weight: 71.8 kg (158 lb 3.2 oz)   Height: 167.6 cm (66\")   PainSc: 8        Estimated body mass index is 25.53 kg/m² as calculated from the following:    Height as of this encounter: 167.6 cm (66\").    Weight as of this encounter: 71.8 kg (158 lb 3.2 oz).    BMI is >= 25 and <30. (Overweight) The following options were offered after discussion;: exercise counseling/recommendations           Does the patient have evidence of cognitive impairment? No  Lab Results   Component Value Date    CHLPL 169 06/09/2025    TRIG 109 06/09/2025    HDL 60 06/09/2025    LDL 89 06/09/2025    VLDL 20 06/09/2025    HGBA1C 5.90 (H) 06/09/2025                                                                                                Health  Risk Assessment    Smoking Status:  Social History     Tobacco Use   Smoking Status Never    Passive exposure: Never   Smokeless Tobacco Never     Alcohol Consumption:  Social History     Substance and Sexual Activity   Alcohol Use No       Fall Risk Screen  STEADI Fall Risk Assessment was completed, and patient is at LOW risk for falls.Assessment completed on:6/18/2025    Depression Screening   Little interest or pleasure in doing things? Not at all   Feeling down, depressed, or hopeless? Not at all   PHQ-2 Total Score 0    "   Health Habits and Functional and Cognitive Screenin/18/2025    12:52 PM   Functional & Cognitive Status   Do you have difficulty preparing food and eating? No   Do you have difficulty bathing yourself, getting dressed or grooming yourself? No   Do you have difficulty using the toilet? No   Do you have difficulty moving around from place to place? No   Do you have trouble with steps or getting out of a bed or a chair? No   Current Diet Well Balanced Diet   Dental Exam Up to date   Eye Exam Up to date   Exercise (times per week) 7 times per week   Current Exercises Include Walking   Do you need help using the phone?  No   Are you deaf or do you have serious difficulty hearing?  No   Do you need help to go to places out of walking distance? No   Do you need help shopping? No   Do you need help preparing meals?  No   Do you need help with housework?  No   Do you need help with laundry? No   Do you need help taking your medications? No   Do you need help managing money? No   Do you ever drive or ride in a car without wearing a seat belt? No   Have you felt unusual stress, anger or loneliness in the last month? Yes   Who do you live with? Spouse   If you need help, do you have trouble finding someone available to you? No   Have you been bothered in the last four weeks by sexual problems? No   Do you have difficulty concentrating, remembering or making decisions? No           Age-appropriate Screening Schedule:  Refer to the list below for future screening recommendations based on patient's age, sex and/or medical conditions. Orders for these recommended tests are listed in the plan section. The patient has been provided with a written plan.    Health Maintenance List  Health Maintenance   Topic Date Due    TDAP/TD VACCINES (1 - Tdap) Never done    Pneumococcal Vaccine 50+ (1 of 1 - PCV) Never done    ZOSTER VACCINE (1 of 2) Never done    COLORECTAL CANCER SCREENING  2024    COVID-19 Vaccine (  "season) 09/01/2024    DXA SCAN  08/10/2025    INFLUENZA VACCINE  07/01/2025    MAMMOGRAM  04/15/2026    LIPID PANEL  06/09/2026    ANNUAL WELLNESS VISIT  06/18/2026    HEPATITIS C SCREENING  Discontinued                                                                                                                                                CMS Preventative Services Quick Reference  Risk Factors Identified During Encounter  None Identified    The above risks/problems have been discussed with the patient.  Pertinent information has been shared with the patient in the After Visit Summary.  An After Visit Summary and PPPS were made available to the patient.    Follow Up:   Next Medicare Wellness visit to be scheduled in 1 year.         Additional E&M Note during same encounter follows:  Patient has additional, significant, and separately identifiable condition(s)/problem(s) that require work above and beyond the Medicare Wellness Visit     Chief Complaint  Medicare Wellness-Initial Visit (Pt is present for initial wellness visit, pt would like to discuss heartburn, as well as lab results. ) and Heartburn (Pt states she is not currently on medication for this but would like to discuss starting/)    Subjective    HPI                   Objective   Vital Signs:  /76   Pulse 70   Temp 98.6 °F (37 °C)   Ht 167.6 cm (66\")   Wt 71.8 kg (158 lb 3.2 oz)   SpO2 97%   BMI 25.53 kg/m²   Physical Exam  Constitutional:       General: She is not in acute distress.     Appearance: Normal appearance. She is not ill-appearing.   HENT:      Head: Normocephalic and atraumatic.   Eyes:      Extraocular Movements: Extraocular movements intact.   Cardiovascular:      Rate and Rhythm: Normal rate and regular rhythm.      Heart sounds: Normal heart sounds. No murmur heard.  Pulmonary:      Effort: Pulmonary effort is normal. No respiratory distress.      Breath sounds: Normal breath sounds. No stridor. No wheezing, rhonchi or " rales.   Neurological:      Mental Status: She is alert.                   CMP          6/9/2025    10:03   CMP   Glucose 92    BUN 16.0    Creatinine 0.76    EGFR 86.5    Sodium 141    Potassium 4.2    Chloride 103    Calcium 9.6    Total Protein 7.4    Albumin 4.4    Globulin 3.0    Total Bilirubin 0.5    Alkaline Phosphatase 105    AST (SGOT) 25    ALT (SGPT) 26    Albumin/Globulin Ratio 1.5    BUN/Creatinine Ratio 21.1      CBC          7/30/2024    04:21 7/31/2024    04:41 6/9/2025    10:03   CBC   WBC 12.39     7.86     6.09    RBC 4.20     4.11     4.62    Hemoglobin 12.5     12.2     14.4    Hematocrit 37.9     37.3     42.8    MCV 90.2     90.8     92.6    MCH 29.8     29.7     31.2    MCHC 33.0     32.7     33.6    RDW 12.9     12.8     12.1    Platelets 223     197     242       Details          This result is from an external source.             Lipid Panel          6/9/2025    10:03   Lipid Panel   Total Cholesterol 169    Triglycerides 109    HDL Cholesterol 60    VLDL Cholesterol 20    LDL Cholesterol  89      TSH          6/9/2025    10:03   TSH   TSH 2.190      Most Recent A1C          6/9/2025    10:03   HGBA1C Most Recent   Hemoglobin A1C 5.90        UA          8/20/2024    16:28 6/9/2025    10:03   Urinalysis   Ketones, UA Negative     Blood, UA  Trace    Leukocytes, UA Negative  Trace    Nitrite, UA  Negative    RBC, UA  3-5    Bacteria, UA  Comment        Results             Assessment and Plan           Diagnosis Plan   1. Medicare annual wellness visit, subsequent        2. Situational anxiety        3. Gastroesophageal reflux disease, unspecified whether esophagitis present  pantoprazole (PROTONIX) 40 MG EC tablet      4. PTSD (post-traumatic stress disorder)        5. Pure hypercholesterolemia  atorvastatin (LIPITOR) 10 MG tablet      6. Nonobstructive atherosclerosis of coronary artery        7. Prediabetes        8. Osteopenia of multiple sites            Healthcare maintenance  -  Scheduled mammogram and DEXA scan in 08/2025, colonoscopy in 09/2025  - Continue regular dental check-ups every 3 months  - Updated glasses prescription last year  - Recommended Tdap vaccine at pharmacy and pneumococcal vaccine at clinic  - Information about pneumonia vaccine provided    Osteopenia  - Maintain brisk walking regimen for 30 minutes daily, five days a week  - Continue multivitamin with vitamin D 1000 IU    GERD:  - Acid reflux flare-up likely due to stress from a family emergency  - Normal cardiac workup  - Prescribed pantoprazole as needed  - Further evaluation if symptoms persist or worsen    Prediabetes:  - A1c levels slightly increased but within prediabetic range  - Normal WBC, Hgb, platelets, electrolytes, kidney, and liver functions  - Advised to monitor diet, reduce sugar and carbohydrate intake  - Recheck A1c every 6-12 months    Hyperlipidemia:  - Cholesterol well-managed with atorvastatin  - Continue atorvastatin therapy  - Prescription refill sent to pharmacy    PTSD  Depression  - Stable on Zoloft for 15 years  - Continue medication plus regular therapy appointment  - Inform provider if experiencing breakthrough symptoms for potential medication adjustments           Follow Up   Return in about 6 months (around 12/18/2025) for f/u chronic conditions.  Patient was given instructions and counseling regarding her condition or for health maintenance advice. Please see specific information pulled into the AVS if appropriate.  Patient or patient representative verbalized consent for the use of Ambient Listening during the visit with  Jil Mackey MD for chart documentation. 6/18/2025  13:25 EDT